# Patient Record
Sex: MALE | Race: WHITE | NOT HISPANIC OR LATINO
[De-identification: names, ages, dates, MRNs, and addresses within clinical notes are randomized per-mention and may not be internally consistent; named-entity substitution may affect disease eponyms.]

---

## 2017-01-11 ENCOUNTER — APPOINTMENT (OUTPATIENT)
Dept: SPINE | Facility: CLINIC | Age: 52
End: 2017-01-11

## 2017-01-11 VITALS
BODY MASS INDEX: 29.03 KG/M2 | WEIGHT: 185 LBS | DIASTOLIC BLOOD PRESSURE: 84 MMHG | HEIGHT: 67 IN | SYSTOLIC BLOOD PRESSURE: 132 MMHG

## 2020-10-09 ENCOUNTER — TRANSCRIPTION ENCOUNTER (OUTPATIENT)
Age: 55
End: 2020-10-09

## 2021-03-12 ENCOUNTER — TRANSCRIPTION ENCOUNTER (OUTPATIENT)
Age: 56
End: 2021-03-12

## 2021-05-07 ENCOUNTER — TRANSCRIPTION ENCOUNTER (OUTPATIENT)
Age: 56
End: 2021-05-07

## 2021-07-22 ENCOUNTER — TRANSCRIPTION ENCOUNTER (OUTPATIENT)
Age: 56
End: 2021-07-22

## 2022-03-23 ENCOUNTER — TRANSCRIPTION ENCOUNTER (OUTPATIENT)
Age: 57
End: 2022-03-23

## 2022-04-11 ENCOUNTER — APPOINTMENT (OUTPATIENT)
Dept: PAIN MANAGEMENT | Facility: CLINIC | Age: 57
End: 2022-04-11

## 2022-05-03 ENCOUNTER — APPOINTMENT (OUTPATIENT)
Dept: PAIN MANAGEMENT | Facility: CLINIC | Age: 57
End: 2022-05-03

## 2022-05-31 ENCOUNTER — APPOINTMENT (OUTPATIENT)
Dept: PAIN MANAGEMENT | Facility: CLINIC | Age: 57
End: 2022-05-31
Payer: MEDICARE

## 2022-05-31 VITALS — HEIGHT: 67 IN | WEIGHT: 193 LBS | BODY MASS INDEX: 30.29 KG/M2

## 2022-05-31 PROCEDURE — 99214 OFFICE O/P EST MOD 30 MIN: CPT

## 2022-05-31 NOTE — ASSESSMENT
[FreeTextEntry1] : Patient is presenting with acute/sub-acute radicular pain with impairment in ADLs and functionality.  The pain has not responded to  conservative care including nsaid therapy and/or physical therapy.  There is no bleeding tendency, unstable medical condition, or systemic infection.\par \par B/L L5-S1 TFESI.

## 2022-05-31 NOTE — HISTORY OF PRESENT ILLNESS
[Lower back] : lower back [8] : 8 [5] : 5 [Radiating] : radiating [Sharp] : sharp [Stabbing] : stabbing [Constant] : constant [Household chores] : household chores [Rest] : rest [Injection therapy] : injection therapy [Lying in bed] : lying in bed [Retired] : Work status: retired [FreeTextEntry1] : 05/31/2022: follow up today.  Pain is returning in both legs.  Will schedule repeat.  \par \par 1/10/22: follow up after right L4/5 L5/S1 TFESI on 12/14/21. Had 80% relief so far. Has been having erectile\par dysfunction and urinary incontinence for last 6 months. Has improved a little since injection. Patient advised to follow up with urology.\par \par 11/24/21: follow up today to review MRI's. (11/22/21)Impression:\par 1. Multilevel lumbar and lower thoracic degenerative disc disease most pronounced at L3-4 and L5-S1.\par 2. Small right-sided disc herniation at L1-2 without stenosis or nerve root compression.\par 3. Disc bulges at L2-3 and L3-4 without stenosis or nerve root compression.\par 4. Disc bulge with a right foraminal disc herniation at L4-5 with mild compression of the right L4 nerve root.\par 5. Grade 1 spondylolisthesis at L5-S1 secondary to bilateral spondylolysis of L5 with mild compression of both L5 nerve\par roots in the neural foramen.\par 6. Modic type 1 endplate changes adjacent to the L5-S1 disc.\par 7. Modic type 2 endplate changes adjacent to the L1-2, L3-4, and L5-S1 discs.\par Impression:\par 1. Postoperative study following multilevel cervical laminectomy and fusion without spinal cord or nerve root\par compression.\par 2. Multilevel cervical degenerative disc disease with reversal of cervical lordosis.\par 3. Small area of myelomalacia in the spinal cord at the level of the C5 superior endplate.\par \par 11/17/21: follow up today. since last visit, he is getting shooting pain across his lower back. Taking Lyrica 100mg PO TID. with some improvement. getting n/t down the left arm. His right leg gives out, externally rotates when he walks. He can not "control" his right leg after it is crossed. -->weakness. given his history I think this warrants a new MRI to evaluate for further cord compression. \par \par 8/10/21- Patient had 80% relief from injection. Has been going to the gym. \par \par 7/12/21- Patient had improvement initial from TFESI. Will schedule repeat.\par \par 5/25/21: follow up today after b/l TFESI L5/S1 on 5/11/21. he reports 2 days following he was pain free. he reports\par pain in the lower back much improved. Getting Lyrica which is helping. still with n/t in the fingers and hands. will\par increase to 100mg bid. \par \par 5/4/21- Patent had spinal surgery in August 2020. Pain improved in neck. Does have numbness in arms. Pain is in back of thighs radiating down both legs. Low back pain is not that bad. Has been on Oxycodone. May need Lyrica. gets burning in the back of the legs. \par \par 1/11/21: F/U TODAY TO REVIEW MRI'S. (1/4/21) Impression:\par 1. Status post laminectomy and posterior fusion from C3-C7. There is a new large fluid collection, probable\par seroma, within the laminectomy surgical site measuring 7.1 x 3.2 x 1.6 cm with multiple thin internal\par septations. No significant surrounding edema to suggest infection. The collection does not directly contact the\par thecal sac. The spinal canal at the surgical levels is significantly decompressed since the previous exam. \par 2. There is mildly progressed disc space narrowing from C3-C7 as compared to the previous exam.\par 3. Multilevel disc bulges which have not significantly changed since the previous exam. No large new\par herniation.\par MRI lumbar spine: Impression:\par 1. Moderate to severe multilevel degenerative disc disease causing varying degrees of the spinal canal and\par neuroforaminal stenosis, detailed above. The bulge at L4-5 is mildly increased in size.\par 2. 1 cm grade 1 anterolisthesis of L5 on S1 with bilateral L5 spondylolysis pars defects, unchanged.\par GURMEET's were helping, however not getting as much prolonged relief. still with relief from last GURMEET.\par \par 12/29/20: follow up today after LESI L5/S1 on 12/15/20. Back pain has improved following >85%. He is having new clonus in the legs and arms. Given his history I believe a MRI of the Cervical and lumbar spine to further evaluate. having continues n/t in the arms that is getting worse. \par \par 10/27/20: Follow up today after LESI L5/S1 on 10/14/20. Back pain about 85% improved. Had tightness across the\par lower back that improved. Pain over the right lower back. MRI of the neck again reviewed. Will need MRI in the near future to evaluate the myelomalacia. Pain in the posterior left calf. \par \par 9/30/20- patient had neck surgery August 18 and is feeling better already. Now complaining of low back pain. Would like a repeat epidural so he can participate in PT. Will get clearance from Dr. Lawson. [] : no [FreeTextEntry7] : b/l legs  [de-identified] : getting up from bed  [de-identified] : mri l spine

## 2022-06-05 ENCOUNTER — NON-APPOINTMENT (OUTPATIENT)
Age: 57
End: 2022-06-05

## 2022-06-07 ENCOUNTER — NON-APPOINTMENT (OUTPATIENT)
Age: 57
End: 2022-06-07

## 2022-06-07 DIAGNOSIS — Z87.891 PERSONAL HISTORY OF NICOTINE DEPENDENCE: ICD-10-CM

## 2022-06-07 DIAGNOSIS — F17.200 NICOTINE DEPENDENCE, UNSPECIFIED, UNCOMPLICATED: ICD-10-CM

## 2022-06-08 ENCOUNTER — APPOINTMENT (OUTPATIENT)
Dept: PAIN MANAGEMENT | Facility: CLINIC | Age: 57
End: 2022-06-08

## 2022-06-08 PROCEDURE — 64483 NJX AA&/STRD TFRM EPI L/S 1: CPT | Mod: 50

## 2022-06-08 NOTE — PROCEDURE
[FreeTextEntry3] : Date of Service: 06/08/2022 \par \par Account: [id number]\par \par Patient: FARIDA BOYD \par \par YOB: 1965\par \par Age: 56 year\par \par \par Surgeon:                                                        Debbie Russ M.D.\par \par Assistant:                                                        None.\par \par Pre-Operative Diagnosis:                            Lumbosacral Radiculitis (M54.17)\par \par Post Operative Diagnosis:                          Lumbosacral Radiculitis (M54.17)\par \par Procedure:                                                     Right L5-S1 transforaminal epidural steroid injection\par \par                                                                         Left L5-S1 transforaminal epidural steroid injection under fluoroscopic guidance.\par \par Anesthesia:                                                    MAC\par \par \par This procedure was carried out using fluoroscopic guidance.  The risks and benefits of the procedure were discussed extensively with the patient.  The consent of the patient was obtained and the following procedure was performed. The patient was placed in the prone position on the fluoroscopic table and the lumbar area was prepped and draped in a sterile fashion.\par \par The left L5-S1 neural foramen was then identified on right oblique  "konstantin dog" anatomical view at the 6 o' clock position using fluoroscopic guidance, and the area was marked. The overlying skin and subcutaneous structures were anesthetized using sterile technique with 1% Lidocaine.  A 22 gauge spinal needle was directed toward the inferior (6 o'clock) position of the pedicle, which formed the roof of the identified foramen.  Once in the epidural space, after negative aspiration for heme and CSF, 1cc of Omnipaque contrast was injected to confirm epidural location and assess filling defects and rule out intravascular needle placement.\par \par The following contrast flow and epidurogram was observed: no intravascular or intrathecal flow pattern was noted.  No blood or CSF was aspirated. Omnipaque spread appeared to outline the left L5 nerve root and spread medially into the epidural space.  \par \par After this, an injectate of 3 cc preservative free normal saline plus 40 mg of Kenalog was injected in the epidural space.\par \par The right L5-S1 neural foramen was then identified on right oblique  "konstantin dog" anatomical view at the 6 o' clock position using fluoroscopic guidance, and the area was marked. The overlying skin and subcutaneous structures were anesthetized using sterile technique with 1% Lidocaine.  A 22 gauge spinal needle was directed toward the inferior (6 o'clock) position of the pedicle, which formed the roof of the identified foramen.  Once in the epidural space, after negative aspiration for heme and CSF, 1cc of Omnipaque contrast was injected to confirm epidural location and assess filling defects and rule out intravascular needle placement. \par \par The following contrast flow and epidurogram was observed: no intravascular or intrathecal flow pattern was noted.  No blood or CSF was aspirated. Omnipaque spread appeared to outline the right L5 nerve root and spread medially into the epidural space.  \par \par After this, an injectate of 3 cc preservative free normal saline plus 40 mg of Kenalog was injected in the epidural space.\par \par The needle was subsequently removed.  Vital signs remained normal.  Pulse oximeter was used throughout the procedure and the patient's pulse and oxygen saturation remained within normal limits.  The patient tolerated the procedure well.  There were no complications.  The patient was instructed to apply ice over the injection sites for twenty minutes every two hours for the next 24 to 48 hours.  The patient was also instructed to contact me immediately if there were any problems.\par \par Debbie Russ M.D.\par

## 2022-06-09 VITALS — WEIGHT: 192 LBS | BODY MASS INDEX: 30.13 KG/M2 | HEIGHT: 67 IN

## 2022-06-09 PROBLEM — Z87.891 FORMER CIGARETTE SMOKER: Status: ACTIVE | Noted: 2022-06-09

## 2022-06-09 NOTE — HISTORY OF PRESENT ILLNESS
[Former] : former smoker [_____ pack-years] : [unfilled] pack-years [TextBox_13] : Referred by Dr. Sea Velez.\par \par Mr. BOYD is a 56 year old male with a history of HTN and COPD.\par \par He was called to review eligibility for Low-Dose CT lung cancer screening.  Reviewed and confirmed that the patient meets screening eligibility criteria:\par \par 56 years old \par \par Smoking Status: Former smoker \par \par Number of pack(s) per day: 2.5\par Number of years smoked: 25\par Number of pack years smokin.5\par \par Number of years since quitting smokin\par Quit year: 2020\par \par Mr. BOYD denies any symptoms of lung cancer, including new cough, change in cough, hemoptysis, and unintentional weight loss.\par \par Mr. BOYD denies any personal history of lung cancer.  No lung cancer in a first degree relative.  Denies any history of lung disease or any history of occupational exposures. [TextBox_6] : 2.5 [TextBox_8] : 25 [TextBox_10] : 2020

## 2022-06-10 ENCOUNTER — APPOINTMENT (OUTPATIENT)
Dept: CT IMAGING | Facility: CLINIC | Age: 57
End: 2022-06-10
Payer: MEDICARE

## 2022-06-10 PROCEDURE — 71271 CT THORAX LUNG CANCER SCR C-: CPT

## 2022-06-22 ENCOUNTER — APPOINTMENT (OUTPATIENT)
Dept: PAIN MANAGEMENT | Facility: CLINIC | Age: 57
End: 2022-06-22
Payer: MEDICARE

## 2022-06-22 VITALS — WEIGHT: 192 LBS | BODY MASS INDEX: 30.13 KG/M2 | HEIGHT: 67 IN

## 2022-06-22 DIAGNOSIS — N18.1 CHRONIC KIDNEY DISEASE, STAGE 1: ICD-10-CM

## 2022-06-22 PROCEDURE — 99214 OFFICE O/P EST MOD 30 MIN: CPT

## 2022-06-22 NOTE — HISTORY OF PRESENT ILLNESS
[Lower back] : lower back [8] : 8 [5] : 5 [Radiating] : radiating [Constant] : constant [Household chores] : household chores [Rest] : rest [Injection therapy] : injection therapy [Lying in bed] : lying in bed [Retired] : Work status: retired [Neck] : neck [Dull/Aching] : dull/aching [Walking] : walking [FreeTextEntry1] : 06/22/2022: follow up today after b/l L5/S1 TFESI on 6/8/22. he reports an overall improvement. now he has good days and bad days. Pain worse in the morning.  had lumbar MBB's in 2019 with 100% relief of his pain. ended up with a cervical myelopathy which put the lower back on hold \par \par 05/31/2022: follow up today.  Pain is returning in both legs.  Will schedule repeat.  \par \par 1/10/22: follow up after right L4/5 L5/S1 TFESI on 12/14/21. Had 80% relief so far. Has been having erectile\par dysfunction and urinary incontinence for last 6 months. Has improved a little since injection. Patient advised to follow up with urology.\par \par 11/24/21: follow up today to review MRI's. (11/22/21)Impression:\par 1. Multilevel lumbar and lower thoracic degenerative disc disease most pronounced at L3-4 and L5-S1.\par 2. Small right-sided disc herniation at L1-2 without stenosis or nerve root compression.\par 3. Disc bulges at L2-3 and L3-4 without stenosis or nerve root compression.\par 4. Disc bulge with a right foraminal disc herniation at L4-5 with mild compression of the right L4 nerve root.\par 5. Grade 1 spondylolisthesis at L5-S1 secondary to bilateral spondylolysis of L5 with mild compression of both L5 nerveroots in the neural foramen.\par 6. Modic type 1 endplate changes adjacent to the L5-S1 disc.\par 7. Modic type 2 endplate changes adjacent to the L1-2, L3-4, and L5-S1 discs.\par \par Impression:\par 1. Postoperative study following multilevel cervical laminectomy and fusion without spinal cord or nerve root\par compression.\par 2. Multilevel cervical degenerative disc disease with reversal of cervical lordosis.\par 3. Small area of myelomalacia in the spinal cord at the level of the C5 superior endplate.\par \par 11/17/21: follow up today. since last visit, he is getting shooting pain across his lower back. Taking Lyrica 100mg PO TID. with some improvement. getting n/t down the left arm. His right leg gives out, externally rotates when he walks. He can not "control" his right leg after it is crossed. -->weakness. given his history I think this warrants a new MRI to evaluate for further cord compression. \par \par 8/10/21- Patient had 80% relief from injection. Has been going to the gym. \par \par 7/12/21- Patient had improvement initial from TFESI. Will schedule repeat.\par \par 5/25/21: follow up today after b/l TFESI L5/S1 on 5/11/21. he reports 2 days following he was pain free. he reports\par pain in the lower back much improved. Getting Lyrica which is helping. still with n/t in the fingers and hands. will\par increase to 100mg bid. \par \par 5/4/21- Patent had spinal surgery in August 2020. Pain improved in neck. Does have numbness in arms. Pain is in back of thighs radiating down both legs. Low back pain is not that bad. Has been on Oxycodone. May need Lyrica. gets burning in the back of the legs. \par \par 1/11/21: F/U TODAY TO REVIEW MRI'S. (1/4/21) Impression:\par 1. Status post laminectomy and posterior fusion from C3-C7. There is a new large fluid collection, probable\par seroma, within the laminectomy surgical site measuring 7.1 x 3.2 x 1.6 cm with multiple thin internal\par septations. No significant surrounding edema to suggest infection. The collection does not directly contact the\par thecal sac. The spinal canal at the surgical levels is significantly decompressed since the previous exam. \par 2. There is mildly progressed disc space narrowing from C3-C7 as compared to the previous exam.\par 3. Multilevel disc bulges which have not significantly changed since the previous exam. No large new\par herniation.\par MRI lumbar spine: Impression:\par 1. Moderate to severe multilevel degenerative disc disease causing varying degrees of the spinal canal and\par neuroforaminal stenosis, detailed above. The bulge at L4-5 is mildly increased in size.\par 2. 1 cm grade 1 anterolisthesis of L5 on S1 with bilateral L5 spondylolysis pars defects, unchanged.\par GURMEET's were helping, however not getting as much prolonged relief. still with relief from last GURMEET.\par \par 12/29/20: follow up today after LESI L5/S1 on 12/15/20. Back pain has improved following >85%. He is having new clonus in the legs and arms. Given his history I believe a MRI of the Cervical and lumbar spine to further evaluate. having continues n/t in the arms that is getting worse. \par \par 10/27/20: Follow up today after LESI L5/S1 on 10/14/20. Back pain about 85% improved. Had tightness across the\par lower back that improved. Pain over the right lower back. MRI of the neck again reviewed. Will need MRI in the near future to evaluate the myelomalacia. Pain in the posterior left calf. \par \par 9/30/20- patient had neck surgery August 18 and is feeling better already. Now complaining of low back pain. Would like a repeat epidural so he can participate in PT. Will get clearance from Dr. Lawson. [] : no [FreeTextEntry6] : numbness  [FreeTextEntry7] : left forearm  [de-identified] : getting up from bed  [de-identified] : mri l spine

## 2022-06-22 NOTE — REVIEW OF SYSTEMS
Is This A New Presentation, Or A Follow-Up?: Follow Up Irritated Nevi
Additional History: Patient is here today for excision of rule out irritated nevus on right lower cutaneous lip.
[Negative] : Heme/Lymph

## 2022-06-22 NOTE — DATA REVIEWED
[MRI] : MRI [Lumbar Spine] : lumbar spine [Report was reviewed and noted in the chart] : The report was reviewed and noted in the chart [I independently reviewed and interpreted images and report] : I independently reviewed and interpreted images and report [I reviewed the films/CD and agree] : I reviewed the films/CD and agree

## 2022-06-22 NOTE — ASSESSMENT
[FreeTextEntry1] : After discussing various treatment options with the patient including but not limited to oral medications, physical therapy, exercise, modalities as well as interventional spinal injections, we have decided with the following plan:\par \par 1) Intervention Injection Therapy:\par I personally reviewed the MRI/CT scan images and agree with the radiologist's report. The radiological findings were discussed with the patient.\par The risks, benefits, contents and alternatives to injection were explained in full to the patient. Risks outlined include but are not limited to infection,sepsis, bleeding, post-dural puncture headache, nerve damage, temporary increase in pain, syncopal episode, failure to resolve symptoms, allergic reaction, symptom recurrence, and elevation of blood sugar in diabetics. Cortisone may cause immunosuppression. Patient understands the risks. All questions were answered. After discussion of options, patient requested an injection. Information regarding the injection was given to the patient. Which medications to stop prior to the injection was explained to the patient as well.\par \par Follow up in 1-2 weeks post injection for re-evaluation. \par Continue Home exercises, stretching, activity modification, physical therapy, and conservative care.\par \par b/l lumbar RFA -->will call \par \par \par

## 2022-08-10 ENCOUNTER — APPOINTMENT (OUTPATIENT)
Dept: PAIN MANAGEMENT | Facility: CLINIC | Age: 57
End: 2022-08-10

## 2022-08-10 PROCEDURE — 64636Z: CUSTOM | Mod: 50

## 2022-08-10 PROCEDURE — J3490M: CUSTOM

## 2022-08-10 PROCEDURE — 64635 DESTROY LUMB/SAC FACET JNT: CPT | Mod: LT

## 2022-08-10 NOTE — PROCEDURE
[FreeTextEntry3] : Date of Service: 08/10/2022 \par \par Account: [id number]\par \par Patient: FARIDA BOYD \par \par YOB: 1965\par \par Age: 57 year\par \par \par PREOPERATIVE DIAGNOSIS: Spondylosis of Lumbar Region without Myelopathy or Radiculopathy (M47.816)\par \par     1. \par \par         POSTOPERATIVE DIAGNOSIS: Spondylosis of Lumbar Region without Myelopathy or Radiculopathy (M47.816)\par \par     1. \par \par         PROCEDURE:\par \par         1) Right L3, L4, L5 and Left L3, L4, L5 medial branch radiofrequency ablation under fluoroscopic guidance.                        \par \par Anesthesia:                                                      MAC\par \par \par Risks, benefits and alternatives of the procedure were discussed with the patient after which they agreed to proceed.  Patient was brought into fluoroscopy suite and was placed in prone position with hip support. Back was prepped and draped in a sterile fashion x3. Anesthesia initiated.\par \par Under AP visualization, the right and left sacral ala was identified and marked. Using a 25 gauge ½ inch needle the skin and subcutaneous structures at this point were localized with 1% Lidocaine using approximately 3 cc's 1% Lidocaine.  After this, a 20 gauge 100mm Mann radiofrequency needle with a 10mm curved tip was inserted and using depth direction depth technique under constant fluoroscopic visualization, the needle was advanced to the sacral ala until os was contacted. \par \par The camera was then redirected under AP view to visualize the right and left L4 and L5 vertebra. The camera was obliqued to approximately 30 degrees to reveal good Irchard dog anatomical view. The junction of the superior articulate process and transverse process at the L4 and L5 levels  were then identified and marked. Skin and subcutaneous structures were then anesthetized with approximately 3 cc's of 1% Lidocaine at each of these levels. After which a 20 gauge 100mm [place] radiofrequency needle with a 10mm curved tip then advanced until the junction at the SAP and transverse process was met.  The camera was then directed through the lateral view and under constant fluoroscopic visualization, the needle tips were then advanced and confirmed at the junction of the SAP and transverse process. \par \par The stylette for the most cephalad needle at the L4 level was then removed and a [place] radiofrequency probe was then placed inside the needle. After their pedis' were checked at approximately 300 ohm, 50 hertz sensory stimulation was performed.  Patient experienced concordant pain in their low back at approximately 0.3 volts. The voltage was then increased to 1 volt. The patient reported increased low back pain symptoms without any radiation below the knee.  Stimulation was then changed to 2 hertz and increased slowly by .1 volt increments at approximately 0.5 volts, patient began to experience thumping like reproductive pain in their low back. The voltage was then increased to approximately 2.5 volts. Patient experienced increasing thumping without any sensation below their knee. This exact stimulation was then repeated for the L5 needle as well as sacral ala needle with concordant pain and no radiation below the knee. The 6 levels were then anesthetized with approximately 0.5 cc's of 1% Lidocaine. After which each area was then ablated at 80 degrees centigrade for 90 seconds each. Patient felt no pain reproduction during the ablation procedure.  After each of these levels were ablated and injected approximately 1 cc of 0.25% Bupivacaine plus 80 mg of Kenalog were then injected before the needles were removed.  Pressure was then applied to the low back. Band-aids were applied.  Patient was brought to the recovery, ambulated on their own after the procedure and reported decreased low back pain.\par \par Anesthesia personnel were present throughout the procedure. Patient was told to apply ice to the low back for 20 minutes on and 20 minutes off for focal symptoms for 24-48 hours. They should call the office if they have any questions or concerns. \par \par Debbie Russ M.D.\par

## 2022-08-24 ENCOUNTER — APPOINTMENT (OUTPATIENT)
Dept: PAIN MANAGEMENT | Facility: CLINIC | Age: 57
End: 2022-08-24

## 2022-08-24 VITALS — HEIGHT: 67 IN | WEIGHT: 195 LBS | BODY MASS INDEX: 30.61 KG/M2

## 2022-08-24 PROCEDURE — 99213 OFFICE O/P EST LOW 20 MIN: CPT

## 2022-08-24 NOTE — ASSESSMENT
[FreeTextEntry1] : After discussing various treatment options with the patient including but not limited to oral medications, physical therapy, exercise, modalities as well as interventional spinal injections, we have decided with the following plan:\par \par 1) spine surgeon follow up --> Dr. Miguel\par \par 2) can repeat RFA in 6 months if needed. \par \par \par \par

## 2022-08-24 NOTE — HISTORY OF PRESENT ILLNESS
[Lower back] : lower back [Rest] : rest [Injection therapy] : injection therapy [Walking] : walking [Neck] : neck [8] : 8 [5] : 5 [Dull/Aching] : dull/aching [Radiating] : radiating [Constant] : constant [Household chores] : household chores [Lying in bed] : lying in bed [Retired] : Work status: retired [FreeTextEntry1] : 08/24/2022: follow up today.  Had 100% relief from back pain from RFA.  Pain is returning in both legs.  He had no pain in legs for 1 week.  The last epidural only lasted 1 month.  Would like to consider surgery.\par \par 06/22/2022: follow up today after b/l L5/S1 TFESI on 6/8/22. he reports an overall improvement. now he has good days and bad days. Pain worse in the morning.  had lumbar MBB's in 2019 with 100% relief of his pain. ended up with a cervical myelopathy which put the lower back on hold \par \par 05/31/2022: follow up today.  Pain is returning in both legs.  Will schedule repeat.  \par \par 1/10/22: follow up after right L4/5 L5/S1 TFESI on 12/14/21. Had 80% relief so far. Has been having erectile\par dysfunction and urinary incontinence for last 6 months. Has improved a little since injection. Patient advised to follow up with urology.\par \par 11/24/21: follow up today to review MRI's. (11/22/21)Impression:\par 1. Multilevel lumbar and lower thoracic degenerative disc disease most pronounced at L3-4 and L5-S1.\par 2. Small right-sided disc herniation at L1-2 without stenosis or nerve root compression.\par 3. Disc bulges at L2-3 and L3-4 without stenosis or nerve root compression.\par 4. Disc bulge with a right foraminal disc herniation at L4-5 with mild compression of the right L4 nerve root.\par 5. Grade 1 spondylolisthesis at L5-S1 secondary to bilateral spondylolysis of L5 with mild compression of both L5 nerve roots in the neural foramen.\par 6. Modic type 1 endplate changes adjacent to the L5-S1 disc.\par 7. Modic type 2 endplate changes adjacent to the L1-2, L3-4, and L5-S1 discs.\par \par Impression:\par 1. Postoperative study following multilevel cervical laminectomy and fusion without spinal cord or nerve root\par compression.\par 2. Multilevel cervical degenerative disc disease with reversal of cervical lordosis.\par 3. Small area of myelomalacia in the spinal cord at the level of the C5 superior endplate.\par \par 11/17/21: follow up today. since last visit, he is getting shooting pain across his lower back. Taking Lyrica 100mg PO TID. with some improvement. getting n/t down the left arm. His right leg gives out, externally rotates when he walks. He can not "control" his right leg after it is crossed. -->weakness. given his history I think this warrants a new MRI to evaluate for further cord compression. \par \par 8/10/21- Patient had 80% relief from injection. Has been going to the gym. \par \par 7/12/21- Patient had improvement initial from TFESI. Will schedule repeat.\par \par 5/25/21: follow up today after b/l TFESI L5/S1 on 5/11/21. he reports 2 days following he was pain free. he reports\par pain in the lower back much improved. Getting Lyrica which is helping. still with n/t in the fingers and hands. will\par increase to 100mg bid. \par \par 5/4/21- Patent had spinal surgery in August 2020. Pain improved in neck. Does have numbness in arms. Pain is in back of thighs radiating down both legs. Low back pain is not that bad. Has been on Oxycodone. May need Lyrica. gets burning in the back of the legs. \par \par 1/11/21: F/U TODAY TO REVIEW MRI'S. (1/4/21) Impression:\par 1. Status post laminectomy and posterior fusion from C3-C7. There is a new large fluid collection, probable\par seroma, within the laminectomy surgical site measuring 7.1 x 3.2 x 1.6 cm with multiple thin internal\par septations. No significant surrounding edema to suggest infection. The collection does not directly contact the\par thecal sac. The spinal canal at the surgical levels is significantly decompressed since the previous exam. \par 2. There is mildly progressed disc space narrowing from C3-C7 as compared to the previous exam.\par 3. Multilevel disc bulges which have not significantly changed since the previous exam. No large new\par herniation.\par MRI lumbar spine: Impression:\par 1. Moderate to severe multilevel degenerative disc disease causing varying degrees of the spinal canal and\par neuroforaminal stenosis, detailed above. The bulge at L4-5 is mildly increased in size.\par 2. 1 cm grade 1 anterolisthesis of L5 on S1 with bilateral L5 spondylolysis pars defects, unchanged.\par GURMEET's were helping, however not getting as much prolonged relief. still with relief from last GURMEET.\par \par 12/29/20: follow up today after LESI L5/S1 on 12/15/20. Back pain has improved following >85%. He is having new clonus in the legs and arms. Given his history I believe a MRI of the Cervical and lumbar spine to further evaluate. having continues n/t in the arms that is getting worse. \par \par 10/27/20: Follow up today after LESI L5/S1 on 10/14/20. Back pain about 85% improved. Had tightness across the\par lower back that improved. Pain over the right lower back. MRI of the neck again reviewed. Will need MRI in the near future to evaluate the myelomalacia. Pain in the posterior left calf. \par \par 9/30/20- patient had neck surgery August 18 and is feeling better already. Now complaining of low back pain. Would like a repeat epidural so he can participate in PT. Will get clearance from Dr. Lawson. [] : no [FreeTextEntry6] : numbness  [FreeTextEntry7] : left forearm  [de-identified] : getting up from bed  [de-identified] : mri l spine  [TWNoteComboBox1] : 90%

## 2022-08-24 NOTE — PHYSICAL EXAM
[NL (90)] : forward flexion 90 degrees [] : no palpable masses [de-identified] : extension 20 degrees

## 2022-09-06 ENCOUNTER — APPOINTMENT (OUTPATIENT)
Dept: ORTHOPEDIC SURGERY | Facility: CLINIC | Age: 57
End: 2022-09-06

## 2022-09-16 ENCOUNTER — RX RENEWAL (OUTPATIENT)
Age: 57
End: 2022-09-16

## 2022-09-22 ENCOUNTER — APPOINTMENT (OUTPATIENT)
Dept: ORTHOPEDIC SURGERY | Facility: CLINIC | Age: 57
End: 2022-09-22

## 2022-09-22 VITALS — HEIGHT: 67 IN | WEIGHT: 195 LBS | BODY MASS INDEX: 30.61 KG/M2

## 2022-09-22 PROCEDURE — 72110 X-RAY EXAM L-2 SPINE 4/>VWS: CPT

## 2022-09-22 PROCEDURE — 99215 OFFICE O/P EST HI 40 MIN: CPT

## 2022-10-04 ENCOUNTER — FORM ENCOUNTER (OUTPATIENT)
Age: 57
End: 2022-10-04

## 2022-10-21 ENCOUNTER — OUTPATIENT (OUTPATIENT)
Dept: OUTPATIENT SERVICES | Facility: HOSPITAL | Age: 57
LOS: 1 days | Discharge: HOME | End: 2022-10-21

## 2022-10-21 VITALS
HEART RATE: 61 BPM | SYSTOLIC BLOOD PRESSURE: 123 MMHG | DIASTOLIC BLOOD PRESSURE: 90 MMHG | TEMPERATURE: 98 F | OXYGEN SATURATION: 96 % | HEIGHT: 67 IN | RESPIRATION RATE: 17 BRPM | WEIGHT: 204.37 LBS

## 2022-10-21 DIAGNOSIS — M47.816 SPONDYLOSIS WITHOUT MYELOPATHY OR RADICULOPATHY, LUMBAR REGION: ICD-10-CM

## 2022-10-21 DIAGNOSIS — Z01.818 ENCOUNTER FOR OTHER PREPROCEDURAL EXAMINATION: ICD-10-CM

## 2022-10-21 DIAGNOSIS — Z98.890 OTHER SPECIFIED POSTPROCEDURAL STATES: Chronic | ICD-10-CM

## 2022-10-21 DIAGNOSIS — Z98.1 ARTHRODESIS STATUS: Chronic | ICD-10-CM

## 2022-10-21 DIAGNOSIS — E78.5 HYPERLIPIDEMIA, UNSPECIFIED: ICD-10-CM

## 2022-10-21 LAB
A1C WITH ESTIMATED AVERAGE GLUCOSE RESULT: 5.9 % — HIGH (ref 4–5.6)
ALBUMIN SERPL ELPH-MCNC: 3.7 G/DL — SIGNIFICANT CHANGE UP (ref 3.3–5)
ALP SERPL-CCNC: 70 U/L — SIGNIFICANT CHANGE UP (ref 40–120)
ALT FLD-CCNC: 23 U/L — SIGNIFICANT CHANGE UP (ref 12–78)
ANION GAP SERPL CALC-SCNC: 7 MMOL/L — SIGNIFICANT CHANGE UP (ref 5–17)
APTT BLD: 40.1 SEC — HIGH (ref 27.5–35.5)
AST SERPL-CCNC: 24 U/L — SIGNIFICANT CHANGE UP (ref 15–37)
BASOPHILS # BLD AUTO: 0.12 K/UL — SIGNIFICANT CHANGE UP (ref 0–0.2)
BASOPHILS NFR BLD AUTO: 1.3 % — SIGNIFICANT CHANGE UP (ref 0–2)
BILIRUB SERPL-MCNC: 0.4 MG/DL — SIGNIFICANT CHANGE UP (ref 0.2–1.2)
BUN SERPL-MCNC: 25 MG/DL — HIGH (ref 7–23)
CALCIUM SERPL-MCNC: 9.5 MG/DL — SIGNIFICANT CHANGE UP (ref 8.5–10.1)
CHLORIDE SERPL-SCNC: 111 MMOL/L — HIGH (ref 96–108)
CO2 SERPL-SCNC: 25 MMOL/L — SIGNIFICANT CHANGE UP (ref 22–31)
CREAT SERPL-MCNC: 1.53 MG/DL — HIGH (ref 0.5–1.3)
EGFR: 52 ML/MIN/1.73M2 — LOW
EOSINOPHIL # BLD AUTO: 0.43 K/UL — SIGNIFICANT CHANGE UP (ref 0–0.5)
EOSINOPHIL NFR BLD AUTO: 4.6 % — SIGNIFICANT CHANGE UP (ref 0–6)
ESTIMATED AVERAGE GLUCOSE: 123 MG/DL — HIGH (ref 68–114)
GLUCOSE SERPL-MCNC: 87 MG/DL — SIGNIFICANT CHANGE UP (ref 70–99)
HCT VFR BLD CALC: 47.5 % — SIGNIFICANT CHANGE UP (ref 39–50)
HGB BLD-MCNC: 15.4 G/DL — SIGNIFICANT CHANGE UP (ref 13–17)
IMM GRANULOCYTES NFR BLD AUTO: 1.2 % — HIGH (ref 0–0.9)
INR BLD: 1.02 RATIO — SIGNIFICANT CHANGE UP (ref 0.88–1.16)
LYMPHOCYTES # BLD AUTO: 2.21 K/UL — SIGNIFICANT CHANGE UP (ref 1–3.3)
LYMPHOCYTES # BLD AUTO: 23.5 % — SIGNIFICANT CHANGE UP (ref 13–44)
MCHC RBC-ENTMCNC: 30.4 PG — SIGNIFICANT CHANGE UP (ref 27–34)
MCHC RBC-ENTMCNC: 32.4 G/DL — SIGNIFICANT CHANGE UP (ref 32–36)
MCV RBC AUTO: 93.9 FL — SIGNIFICANT CHANGE UP (ref 80–100)
MONOCYTES # BLD AUTO: 1.08 K/UL — HIGH (ref 0–0.9)
MONOCYTES NFR BLD AUTO: 11.5 % — SIGNIFICANT CHANGE UP (ref 2–14)
NEUTROPHILS # BLD AUTO: 5.44 K/UL — SIGNIFICANT CHANGE UP (ref 1.8–7.4)
NEUTROPHILS NFR BLD AUTO: 57.9 % — SIGNIFICANT CHANGE UP (ref 43–77)
NRBC # BLD: 0 /100 WBCS — SIGNIFICANT CHANGE UP (ref 0–0)
PLATELET # BLD AUTO: 159 K/UL — SIGNIFICANT CHANGE UP (ref 150–400)
POTASSIUM SERPL-MCNC: 4.6 MMOL/L — SIGNIFICANT CHANGE UP (ref 3.5–5.3)
POTASSIUM SERPL-SCNC: 4.6 MMOL/L — SIGNIFICANT CHANGE UP (ref 3.5–5.3)
PROT SERPL-MCNC: 7.1 GM/DL — SIGNIFICANT CHANGE UP (ref 6–8.3)
PROTHROM AB SERPL-ACNC: 12.3 SEC — SIGNIFICANT CHANGE UP (ref 10.5–13.4)
RBC # BLD: 5.06 M/UL — SIGNIFICANT CHANGE UP (ref 4.2–5.8)
RBC # FLD: 14.9 % — HIGH (ref 10.3–14.5)
SODIUM SERPL-SCNC: 143 MMOL/L — SIGNIFICANT CHANGE UP (ref 135–145)
VIT D25+D1,25 OH+D1,25 PNL SERPL-MCNC: 40.2 PG/ML — SIGNIFICANT CHANGE UP (ref 19.9–79.3)
WBC # BLD: 9.39 K/UL — SIGNIFICANT CHANGE UP (ref 3.8–10.5)
WBC # FLD AUTO: 9.39 K/UL — SIGNIFICANT CHANGE UP (ref 3.8–10.5)

## 2022-10-21 PROCEDURE — 86077 PHYS BLOOD BANK SERV XMATCH: CPT

## 2022-10-21 PROCEDURE — 93010 ELECTROCARDIOGRAM REPORT: CPT

## 2022-10-21 NOTE — H&P PST ADULT - ASSESSMENT
lumbar spondylosis  CAPRINI SCORE    AGE RELATED RISK FACTORS                                                       MOBILITY RELATED FACTORS  [x ] Age 41-60 years                                            (1 Point)                  [ ] Bed rest                                                        (1 Point)  [ ] Age: 61-74 years                                           (2 Points)                [ ] Plaster cast                                                   (2 Points)  [ ] Age= 75 years                                              (3 Points)                 [ ] Bed bound for more than 72 hours                   (2 Points)    DISEASE RELATED RISK FACTORS                                               GENDER SPECIFIC FACTORS  [ ] Edema in the lower extremities                       (1 Point)                  [ ] Pregnancy                                                     (1 Point)  [ ] Varicose veins                                               (1 Point)                  [ ] Post-partum < 6 weeks                                   (1 Point)             [x ] BMI > 25 Kg/m2                                            (1 Point)                  [ ] Hormonal therapy  or oral contraception            (1 Point)                 [ ] Sepsis (in the previous month)                        (1 Point)                  [ ] History of pregnancy complications  [ ] Pneumonia or serious lung disease                                               [ ] Unexplained or recurrent                       (1 Point)           (in the previous month)                               (1 Point)  [ ] Abnormal pulmonary function test                     (1 Point)                 SURGERY RELATED RISK FACTORS  [ ] Acute myocardial infarction                              (1 Point)                 [ ]  Section                                            (1 Point)  [ ] Congestive heart failure (in the previous month)  (1 Point)                 [ ] Minor surgery                                                 (1 Point)   [ ] Inflammatory bowel disease                             (1 Point)                 [x ] Arthroscopic surgery                                        (2 Points)  [ ] Central venous access                                    (2 Points)                [ ] General surgery lasting more than 45 minutes   (2 Points)       [ ] Stroke (in the previous month)                          (5 Points)               [ ] Elective arthroplasty                                        (5 Points)                                                                                                                                               HEMATOLOGY RELATED FACTORS                                                 TRAUMA RELATED RISK FACTORS  [ ] Prior episodes of VTE                                     (3 Points)                 [ ] Fracture of the hip, pelvis, or leg                       (5 Points)  [ ] Positive family history for VTE                         (3 Points)                 [ ] Acute spinal cord injury (in the previous month)  (5 Points)  [ ] Prothrombin 17565 A                                      (3 Points)                 [ ] Paralysis  (less than 1 month)                          (5 Points)  [ ] Factor V Leiden                                             (3 Points)                 [ ] Multiple Trauma within 1 month                         (5 Points)  [ ] Lupus anticoagulants                                     (3 Points)                                                           [ ] Anticardiolipin antibodies                                (3 Points)                                                       [ ] High homocysteine in the blood                      (3 Points)                                             [ ] Other congenital or acquired thrombophilia       (3 Points)                                                [ ] Heparin induced thrombocytopenia                  (3 Points)                                          Total Score [    4      ]  Caprini Score 0-2: Low risk, No VTE Prophylaxis required for most patient's, encourage ambulation  Caprini Score 3-6: At Risk, Pharmacologic VTE prophylaxis is indicated for most patients ( in the absence of a contraindication)  Caprini Score Greater than or = 7: High Risk , pharmacologic VTE is indicated for most patients ( in the absence of a contraindication)    Caprini score indicates that the patient is high risk for VTE event ( score 6 or greater). Surgical patient's in this group will benefit from both pharmacologic prophylaxis and intermittent compression devices . Surgical team will determine the balance between VTE  risk and bleeding risk and other clinical considerations

## 2022-10-21 NOTE — H&P PST ADULT - NSANTHOSAYNRD_GEN_A_CORE
No. FLORY screening performed.  STOP BANG Legend: 0-2 = LOW Risk; 3-4 = INTERMEDIATE Risk; 5-8 = HIGH Risk

## 2022-10-21 NOTE — H&P PST ADULT - HEMATOLOGY/LYMPHATICS
Took call from patient directly.  Discussed the return travel arrangements.  Informed him that he would have to come back for PRRT if that is what Dr Basilio recommends for him.  He verbalized understanding.   negative

## 2022-10-21 NOTE — H&P PST ADULT - HISTORY OF PRESENT ILLNESS
58 yo male with back pains which has worsened despite conservative management -now affecting his legs- secondary to spondylosis- scheduled for anterior lumbar fusion   denies recent travels in the past 30 days. No fever, SOB, cough, flu like symptoms or body rash- covid screen   not vaccinated for covid19

## 2022-10-21 NOTE — H&P PST ADULT - PROBLEM SELECTOR PLAN 3
Assessment and Plan: labs - cbc,pt/ptt,bmp,t&s,nose cx,ekg  M/C required  preop 3 day hibiclens instruction reviewed and given .instructed on if  nose cx positive use mupuricin 5 days and checklist given  take routine meds DOS with sips of water. avoid NSAID and OTC supplements. verbalized understanding  information on proper nutrition , increase protein and better food choices provided in packet  patient instructed on having covid19 swab 3-5 days prior to surgery  anesthesiologist to review pst labs, ekg, medical clearances and optimization for surgery

## 2022-10-22 LAB
MRSA PCR RESULT.: SIGNIFICANT CHANGE UP
S AUREUS DNA NOSE QL NAA+PROBE: SIGNIFICANT CHANGE UP

## 2022-10-31 LAB
APTT BLD: 39.2 SEC — HIGH (ref 27.5–35.5)
MRSA PCR RESULT.: SIGNIFICANT CHANGE UP
S AUREUS DNA NOSE QL NAA+PROBE: SIGNIFICANT CHANGE UP

## 2022-11-01 NOTE — HISTORY OF PRESENT ILLNESS
[Lower back] : lower back [Gradual] : gradual [7] : 7 [Dull/Aching] : dull/aching [Sharp] : sharp [Shooting] : shooting [Constant] : constant [Retired] : Work status: retired [de-identified] : had partial and transient relief with injections and MBB\par pain shooting down the legs;   has done PT directed exercises at home for months ;  completed a formal course of PT in the years and months past [] : no [FreeTextEntry5] : pt states he has been experiencing pain on his  lower back for a while, pt states he has surgery on his neck 2 years ago  [FreeTextEntry7] : left leg/ calf

## 2022-11-01 NOTE — ASSESSMENT
[FreeTextEntry1] : 5-1 and 3-4 collapsed with moderate to high grade foraminal stenosis;  there is an anterolisthesis at 5-1 due to a spondylolysis and asymmetric collapse and slip at 3-4 with foraminal stenosis;  the 4-5 facet joints are gapped so there is a dynamic slip;  having failed comprehensive nonsurgical care I proposed he undergo surgery;  while there are a few options WRT the approach in his particular case,  I proposed ALIF 5-1 and 3-4  then  instrumented fusion L345-S1;  4-5 would be posterolateral fusion ;  so the posterior screws can be placed using an open technique or navigated open

## 2022-11-10 ENCOUNTER — TRANSCRIPTION ENCOUNTER (OUTPATIENT)
Age: 57
End: 2022-11-10

## 2022-11-11 ENCOUNTER — INPATIENT (INPATIENT)
Facility: HOSPITAL | Age: 57
LOS: 1 days | Discharge: HOME | End: 2022-11-13
Attending: ORTHOPAEDIC SURGERY | Admitting: ORTHOPAEDIC SURGERY

## 2022-11-11 ENCOUNTER — APPOINTMENT (OUTPATIENT)
Dept: ORTHOPEDIC SURGERY | Facility: HOSPITAL | Age: 57
End: 2022-11-11

## 2022-11-11 ENCOUNTER — TRANSCRIPTION ENCOUNTER (OUTPATIENT)
Age: 57
End: 2022-11-11

## 2022-11-11 VITALS
TEMPERATURE: 98 F | WEIGHT: 203.93 LBS | DIASTOLIC BLOOD PRESSURE: 90 MMHG | HEART RATE: 67 BPM | OXYGEN SATURATION: 96 % | SYSTOLIC BLOOD PRESSURE: 116 MMHG | HEIGHT: 67 IN | RESPIRATION RATE: 18 BRPM

## 2022-11-11 DIAGNOSIS — T83.9XXA UNSPECIFIED COMPLICATION OF GENITOURINARY PROSTHETIC DEVICE, IMPLANT AND GRAFT, INITIAL ENCOUNTER: ICD-10-CM

## 2022-11-11 DIAGNOSIS — M48.061 SPINAL STENOSIS, LUMBAR REGION WITHOUT NEUROGENIC CLAUDICATION: ICD-10-CM

## 2022-11-11 DIAGNOSIS — M54.17 RADICULOPATHY, LUMBOSACRAL REGION: ICD-10-CM

## 2022-11-11 DIAGNOSIS — Z98.1 ARTHRODESIS STATUS: Chronic | ICD-10-CM

## 2022-11-11 DIAGNOSIS — Z98.890 OTHER SPECIFIED POSTPROCEDURAL STATES: ICD-10-CM

## 2022-11-11 DIAGNOSIS — F41.9 ANXIETY DISORDER, UNSPECIFIED: ICD-10-CM

## 2022-11-11 DIAGNOSIS — M47.26 OTHER SPONDYLOSIS WITH RADICULOPATHY, LUMBAR REGION: ICD-10-CM

## 2022-11-11 DIAGNOSIS — M41.56 OTHER SECONDARY SCOLIOSIS, LUMBAR REGION: ICD-10-CM

## 2022-11-11 DIAGNOSIS — Z98.890 OTHER SPECIFIED POSTPROCEDURAL STATES: Chronic | ICD-10-CM

## 2022-11-11 DIAGNOSIS — E78.5 HYPERLIPIDEMIA, UNSPECIFIED: ICD-10-CM

## 2022-11-11 DIAGNOSIS — N35.919 UNSPECIFIED URETHRAL STRICTURE, MALE, UNSPECIFIED SITE: ICD-10-CM

## 2022-11-11 DIAGNOSIS — I10 ESSENTIAL (PRIMARY) HYPERTENSION: ICD-10-CM

## 2022-11-11 DIAGNOSIS — R33.9 RETENTION OF URINE, UNSPECIFIED: ICD-10-CM

## 2022-11-11 DIAGNOSIS — G62.9 POLYNEUROPATHY, UNSPECIFIED: ICD-10-CM

## 2022-11-11 DIAGNOSIS — M51.16 INTERVERTEBRAL DISC DISORDERS WITH RADICULOPATHY, LUMBAR REGION: ICD-10-CM

## 2022-11-11 DIAGNOSIS — M43.17 SPONDYLOLISTHESIS, LUMBOSACRAL REGION: ICD-10-CM

## 2022-11-11 LAB
ANION GAP SERPL CALC-SCNC: 5 MMOL/L — SIGNIFICANT CHANGE UP (ref 5–17)
BASOPHILS # BLD AUTO: 0.11 K/UL — SIGNIFICANT CHANGE UP (ref 0–0.2)
BASOPHILS NFR BLD AUTO: 0.9 % — SIGNIFICANT CHANGE UP (ref 0–2)
BUN SERPL-MCNC: 22 MG/DL — SIGNIFICANT CHANGE UP (ref 7–23)
CALCIUM SERPL-MCNC: 8.5 MG/DL — SIGNIFICANT CHANGE UP (ref 8.5–10.1)
CHLORIDE SERPL-SCNC: 108 MMOL/L — SIGNIFICANT CHANGE UP (ref 96–108)
CO2 SERPL-SCNC: 28 MMOL/L — SIGNIFICANT CHANGE UP (ref 22–31)
CREAT SERPL-MCNC: 1.75 MG/DL — HIGH (ref 0.5–1.3)
EGFR: 44 ML/MIN/1.73M2 — LOW
EOSINOPHIL # BLD AUTO: 0.36 K/UL — SIGNIFICANT CHANGE UP (ref 0–0.5)
EOSINOPHIL NFR BLD AUTO: 2.9 % — SIGNIFICANT CHANGE UP (ref 0–6)
GLUCOSE SERPL-MCNC: 105 MG/DL — HIGH (ref 70–99)
HCT VFR BLD CALC: 43 % — SIGNIFICANT CHANGE UP (ref 39–50)
HGB BLD-MCNC: 13.7 G/DL — SIGNIFICANT CHANGE UP (ref 13–17)
IMM GRANULOCYTES NFR BLD AUTO: 2.4 % — HIGH (ref 0–0.9)
LYMPHOCYTES # BLD AUTO: 1.32 K/UL — SIGNIFICANT CHANGE UP (ref 1–3.3)
LYMPHOCYTES # BLD AUTO: 10.8 % — LOW (ref 13–44)
MCHC RBC-ENTMCNC: 30.2 PG — SIGNIFICANT CHANGE UP (ref 27–34)
MCHC RBC-ENTMCNC: 31.9 G/DL — LOW (ref 32–36)
MCV RBC AUTO: 94.7 FL — SIGNIFICANT CHANGE UP (ref 80–100)
MONOCYTES # BLD AUTO: 0.81 K/UL — SIGNIFICANT CHANGE UP (ref 0–0.9)
MONOCYTES NFR BLD AUTO: 6.6 % — SIGNIFICANT CHANGE UP (ref 2–14)
NEUTROPHILS # BLD AUTO: 9.37 K/UL — HIGH (ref 1.8–7.4)
NEUTROPHILS NFR BLD AUTO: 76.4 % — SIGNIFICANT CHANGE UP (ref 43–77)
NRBC # BLD: 0 /100 WBCS — SIGNIFICANT CHANGE UP (ref 0–0)
PLATELET # BLD AUTO: 134 K/UL — LOW (ref 150–400)
POTASSIUM SERPL-MCNC: 4.8 MMOL/L — SIGNIFICANT CHANGE UP (ref 3.5–5.3)
POTASSIUM SERPL-SCNC: 4.8 MMOL/L — SIGNIFICANT CHANGE UP (ref 3.5–5.3)
RBC # BLD: 4.54 M/UL — SIGNIFICANT CHANGE UP (ref 4.2–5.8)
RBC # FLD: 15.5 % — HIGH (ref 10.3–14.5)
SODIUM SERPL-SCNC: 141 MMOL/L — SIGNIFICANT CHANGE UP (ref 135–145)
WBC # BLD: 12.27 K/UL — HIGH (ref 3.8–10.5)
WBC # FLD AUTO: 12.27 K/UL — HIGH (ref 3.8–10.5)

## 2022-11-11 PROCEDURE — 22558 ARTHRD ANT NTRBD MIN DSC LUM: CPT | Mod: 62

## 2022-11-11 PROCEDURE — 51702 INSERT TEMP BLADDER CATH: CPT

## 2022-11-11 PROCEDURE — 22853 INSJ BIOMECHANICAL DEVICE: CPT

## 2022-11-11 DEVICE — PIN PULSE HIP AND REGISTRATION FIDUCIAL 15CM: Type: IMPLANTABLE DEVICE | Status: FUNCTIONAL

## 2022-11-11 DEVICE — IMPLANTABLE DEVICE: Type: IMPLANTABLE DEVICE | Status: FUNCTIONAL

## 2022-11-11 DEVICE — CLIP APPLIER COVIDIEN SURGICLIP 13" LARGE: Type: IMPLANTABLE DEVICE | Status: FUNCTIONAL

## 2022-11-11 DEVICE — SCREW LOCKG OPEN TULIP RELINE 5.5MM: Type: IMPLANTABLE DEVICE | Status: FUNCTIONAL

## 2022-11-11 DEVICE — PIN PULSE HIP AND REGISTRATION FIDUCIAL 10CM: Type: IMPLANTABLE DEVICE | Status: FUNCTIONAL

## 2022-11-11 DEVICE — SCREW MAS POLYAXIAL 2C RELINE 6.5X45MM: Type: IMPLANTABLE DEVICE | Status: FUNCTIONAL

## 2022-11-11 DEVICE — CLIP APPLIER COVIDIEN SURGICLIP 11.5" MEDIUM: Type: IMPLANTABLE DEVICE | Status: FUNCTIONAL

## 2022-11-11 DEVICE — GRAFT BONE INFUSE KIT LG II: Type: IMPLANTABLE DEVICE | Status: FUNCTIONAL

## 2022-11-11 DEVICE — PLATE ROI-A THICK MED PLUS .5MM: Type: IMPLANTABLE DEVICE | Status: FUNCTIONAL

## 2022-11-11 DEVICE — SURGIFLO MATRIX WITH THROMBIN KIT: Type: IMPLANTABLE DEVICE | Status: FUNCTIONAL

## 2022-11-11 RX ORDER — TAMSULOSIN HYDROCHLORIDE 0.4 MG/1
0.8 CAPSULE ORAL AT BEDTIME
Refills: 0 | Status: DISCONTINUED | OUTPATIENT
Start: 2022-11-11 | End: 2022-11-13

## 2022-11-11 RX ORDER — ONDANSETRON 8 MG/1
4 TABLET, FILM COATED ORAL ONCE
Refills: 0 | Status: DISCONTINUED | OUTPATIENT
Start: 2022-11-11 | End: 2022-11-11

## 2022-11-11 RX ORDER — ASPIRIN/CALCIUM CARB/MAGNESIUM 324 MG
1 TABLET ORAL
Qty: 0 | Refills: 0 | DISCHARGE

## 2022-11-11 RX ORDER — POLYETHYLENE GLYCOL 3350 17 G/17G
17 POWDER, FOR SOLUTION ORAL
Refills: 0 | Status: DISCONTINUED | OUTPATIENT
Start: 2022-11-11 | End: 2022-11-13

## 2022-11-11 RX ORDER — SODIUM CHLORIDE 9 MG/ML
1000 INJECTION, SOLUTION INTRAVENOUS
Refills: 0 | Status: DISCONTINUED | OUTPATIENT
Start: 2022-11-11 | End: 2022-11-11

## 2022-11-11 RX ORDER — OXYCODONE HYDROCHLORIDE 5 MG/1
10 TABLET ORAL EVERY 4 HOURS
Refills: 0 | Status: DISCONTINUED | OUTPATIENT
Start: 2022-11-11 | End: 2022-11-13

## 2022-11-11 RX ORDER — OXYCODONE HYDROCHLORIDE 5 MG/1
10 TABLET ORAL EVERY 12 HOURS
Refills: 0 | Status: DISCONTINUED | OUTPATIENT
Start: 2022-11-11 | End: 2022-11-13

## 2022-11-11 RX ORDER — ACETAMINOPHEN 500 MG
975 TABLET ORAL EVERY 8 HOURS
Refills: 0 | Status: DISCONTINUED | OUTPATIENT
Start: 2022-11-11 | End: 2022-11-13

## 2022-11-11 RX ORDER — OXYCODONE HYDROCHLORIDE 5 MG/1
15 TABLET ORAL EVERY 4 HOURS
Refills: 0 | Status: DISCONTINUED | OUTPATIENT
Start: 2022-11-11 | End: 2022-11-13

## 2022-11-11 RX ORDER — SENNA PLUS 8.6 MG/1
2 TABLET ORAL AT BEDTIME
Refills: 0 | Status: DISCONTINUED | OUTPATIENT
Start: 2022-11-11 | End: 2022-11-13

## 2022-11-11 RX ORDER — HYDROMORPHONE HYDROCHLORIDE 2 MG/ML
0.5 INJECTION INTRAMUSCULAR; INTRAVENOUS; SUBCUTANEOUS
Refills: 0 | Status: DISCONTINUED | OUTPATIENT
Start: 2022-11-11 | End: 2022-11-11

## 2022-11-11 RX ORDER — CYCLOBENZAPRINE HYDROCHLORIDE 10 MG/1
10 TABLET, FILM COATED ORAL EVERY 8 HOURS
Refills: 0 | Status: DISCONTINUED | OUTPATIENT
Start: 2022-11-11 | End: 2022-11-13

## 2022-11-11 RX ORDER — ACETAMINOPHEN 500 MG
1000 TABLET ORAL ONCE
Refills: 0 | Status: COMPLETED | OUTPATIENT
Start: 2022-11-11 | End: 2022-11-11

## 2022-11-11 RX ORDER — ATORVASTATIN CALCIUM 80 MG/1
20 TABLET, FILM COATED ORAL AT BEDTIME
Refills: 0 | Status: DISCONTINUED | OUTPATIENT
Start: 2022-11-12 | End: 2022-11-13

## 2022-11-11 RX ORDER — ONDANSETRON 8 MG/1
4 TABLET, FILM COATED ORAL EVERY 6 HOURS
Refills: 0 | Status: DISCONTINUED | OUTPATIENT
Start: 2022-11-11 | End: 2022-11-13

## 2022-11-11 RX ORDER — HYDROMORPHONE HYDROCHLORIDE 2 MG/ML
1 INJECTION INTRAMUSCULAR; INTRAVENOUS; SUBCUTANEOUS
Refills: 0 | Status: DISCONTINUED | OUTPATIENT
Start: 2022-11-11 | End: 2022-11-11

## 2022-11-11 RX ORDER — SODIUM CHLORIDE 9 MG/ML
3 INJECTION INTRAMUSCULAR; INTRAVENOUS; SUBCUTANEOUS EVERY 8 HOURS
Refills: 0 | Status: DISCONTINUED | OUTPATIENT
Start: 2022-11-11 | End: 2022-11-11

## 2022-11-11 RX ORDER — ASPIRIN/CALCIUM CARB/MAGNESIUM 324 MG
81 TABLET ORAL DAILY
Refills: 0 | Status: DISCONTINUED | OUTPATIENT
Start: 2022-11-13 | End: 2022-11-13

## 2022-11-11 RX ORDER — ATORVASTATIN CALCIUM 80 MG/1
1 TABLET, FILM COATED ORAL
Qty: 0 | Refills: 0 | DISCHARGE

## 2022-11-11 RX ORDER — HYDROMORPHONE HYDROCHLORIDE 2 MG/ML
0.5 INJECTION INTRAMUSCULAR; INTRAVENOUS; SUBCUTANEOUS
Refills: 0 | Status: DISCONTINUED | OUTPATIENT
Start: 2022-11-11 | End: 2022-11-13

## 2022-11-11 RX ADMIN — SODIUM CHLORIDE 100 MILLILITER(S): 9 INJECTION, SOLUTION INTRAVENOUS at 12:08

## 2022-11-11 RX ADMIN — SODIUM CHLORIDE 75 MILLILITER(S): 9 INJECTION, SOLUTION INTRAVENOUS at 17:06

## 2022-11-11 RX ADMIN — HYDROMORPHONE HYDROCHLORIDE 0.5 MILLIGRAM(S): 2 INJECTION INTRAMUSCULAR; INTRAVENOUS; SUBCUTANEOUS at 11:54

## 2022-11-11 RX ADMIN — HYDROMORPHONE HYDROCHLORIDE 0.5 MILLIGRAM(S): 2 INJECTION INTRAMUSCULAR; INTRAVENOUS; SUBCUTANEOUS at 11:55

## 2022-11-11 RX ADMIN — SODIUM CHLORIDE 100 MILLILITER(S): 9 INJECTION, SOLUTION INTRAVENOUS at 11:40

## 2022-11-11 RX ADMIN — TAMSULOSIN HYDROCHLORIDE 0.8 MILLIGRAM(S): 0.4 CAPSULE ORAL at 22:37

## 2022-11-11 RX ADMIN — SENNA PLUS 2 TABLET(S): 8.6 TABLET ORAL at 22:07

## 2022-11-11 RX ADMIN — Medication 975 MILLIGRAM(S): at 22:08

## 2022-11-11 RX ADMIN — OXYCODONE HYDROCHLORIDE 10 MILLIGRAM(S): 5 TABLET ORAL at 17:03

## 2022-11-11 RX ADMIN — HYDROMORPHONE HYDROCHLORIDE 0.5 MILLIGRAM(S): 2 INJECTION INTRAMUSCULAR; INTRAVENOUS; SUBCUTANEOUS at 12:10

## 2022-11-11 RX ADMIN — HYDROMORPHONE HYDROCHLORIDE 0.5 MILLIGRAM(S): 2 INJECTION INTRAMUSCULAR; INTRAVENOUS; SUBCUTANEOUS at 12:38

## 2022-11-11 RX ADMIN — OXYCODONE HYDROCHLORIDE 10 MILLIGRAM(S): 5 TABLET ORAL at 18:00

## 2022-11-11 RX ADMIN — POLYETHYLENE GLYCOL 3350 17 GRAM(S): 17 POWDER, FOR SOLUTION ORAL at 18:28

## 2022-11-11 RX ADMIN — Medication 400 MILLIGRAM(S): at 12:08

## 2022-11-11 RX ADMIN — HYDROMORPHONE HYDROCHLORIDE 0.5 MILLIGRAM(S): 2 INJECTION INTRAMUSCULAR; INTRAVENOUS; SUBCUTANEOUS at 11:39

## 2022-11-11 RX ADMIN — Medication 100 MILLIGRAM(S): at 22:08

## 2022-11-11 RX ADMIN — CYCLOBENZAPRINE HYDROCHLORIDE 10 MILLIGRAM(S): 10 TABLET, FILM COATED ORAL at 22:08

## 2022-11-11 RX ADMIN — Medication 1000 MILLIGRAM(S): at 12:23

## 2022-11-11 RX ADMIN — Medication 100 MILLIGRAM(S): at 17:05

## 2022-11-11 RX ADMIN — OXYCODONE HYDROCHLORIDE 10 MILLIGRAM(S): 5 TABLET ORAL at 19:20

## 2022-11-11 RX ADMIN — OXYCODONE HYDROCHLORIDE 10 MILLIGRAM(S): 5 TABLET ORAL at 18:28

## 2022-11-11 RX ADMIN — Medication 975 MILLIGRAM(S): at 23:00

## 2022-11-11 RX ADMIN — HYDROMORPHONE HYDROCHLORIDE 0.5 MILLIGRAM(S): 2 INJECTION INTRAMUSCULAR; INTRAVENOUS; SUBCUTANEOUS at 12:53

## 2022-11-11 NOTE — PATIENT PROFILE ADULT - FALL HARM RISK - UNIVERSAL INTERVENTIONS
Bed in lowest position, wheels locked, appropriate side rails in place/Call bell, personal items and telephone in reach/Instruct patient to call for assistance before getting out of bed or chair/Non-slip footwear when patient is out of bed/[place] to call system/Physically safe environment - no spills, clutter or unnecessary equipment/Purposeful Proactive Rounding/Room/bathroom lighting operational, light cord in reach

## 2022-11-11 NOTE — DISCHARGE NOTE PROVIDER - CARE PROVIDER_API CALL
Monico Miguel)  Orthopaedic Surgery  [address]  [place] [address]  Phone: [phone]  Fax: [phone]  Follow Up Time:

## 2022-11-11 NOTE — PHYSICAL THERAPY INITIAL EVALUATION ADULT - CRITERIA FOR SKILLED THERAPEUTIC INTERVENTIONS
impairments found/functional limitations in following categories/risk reduction/prevention/rehab potential/therapy frequency/predicted duration of therapy intervention/anticipated equipment needs at discharge/anticipated discharge recommendation normal...

## 2022-11-11 NOTE — DISCHARGE NOTE PROVIDER - HOSPITAL COURSE
57yMale with history of Lumbar Radiculopathy presenting for ALIF L5-S1 by Dr. Miguel on 11/11/2022. Risk and benefits of surgery were explained to the patient. The patient understood and agreed to proceed with surgery. Patient underwent the procedure with no intraoperative complications. Pt was brought in stable condition to the PACU. Once stable in PACU, pt was brought to the floor. During hospital stay pt was followed by Medicine, physical therapy, Home Care during this admission. Pt had an uneventful hospital course. Pt is stable for discharge to home

## 2022-11-11 NOTE — PROGRESS NOTE ADULT - SUBJECTIVE AND OBJECTIVE BOX
post op note  57yMale s/p ALIF L5-S1 under general anesthesia. Pt tolerated procedure well, without any intra-op complications. Pt  in NAD. Pain controlled. Pt denies any new complaints. Pt denies CP/SOB/N/V/D/numbness/tingling/bowel or bladder dysfunction.     PE:     Abdomen: softly distended, Dressing c/d/i   B/L UE: Skin intact. +ROM shoulder/elbow/wrist/fingers. +ok/thumbsup/fingercross signs.  strength: 5/5.  RP2+ NVI.   B/L LE: Skin intact. +ROM hip/knee/ankle/toes. Ankle Dorsi/plantarflexion: 5/5. Calf: soft, compressible and nontender. DP/PT 2+ NVI.                             13.7   12.27 )-----------( 134      ( 11 Nov 2022 12:05 )             43.0       11-11    141  |  108  |  22  ----------------------------<  105<H>  4.8   |  28  |  1.75<H>    Ca    8.5      11 Nov 2022 12:05          A/P: 57yMale s/p  ALIF L5-S1   Bettencourt discontinues in PACU, Void trial  Pain control prn  PT: WBAT - spinal precautions   DVT ppx: SCDs and ambulation  Wound care, Isometric exercises, incentive spirometry   Discharge: planning Home once pain controlled, tolerating PO Diet and have Bowel movement.  All the above discussed and understood by pt

## 2022-11-11 NOTE — PHYSICAL THERAPY INITIAL EVALUATION ADULT - AMBULATION SKILLS, REHAB EVAL
SAC and shopping cart- used SAC indoors and to walk from front door to car in driveways- wobbly withSAC use. Used shopping cart in stores . Reports difficulty in descending stairs/independent/needs device

## 2022-11-11 NOTE — DISCHARGE NOTE PROVIDER - NSDCMRMEDTOKEN_GEN_ALL_CORE_FT
Aspir 81 oral delayed release tablet: 1 tab(s) orally once a day  atorvastatin 20 mg oral tablet: 1 tab(s) orally once a day  oxyCODONE 10 mg oral tablet, extended release:   pregabalin: 1 tab(s) orally 3 times a day   Aspir 81 oral delayed release tablet: 1 tab(s) orally once a day  atorvastatin 20 mg oral tablet: 1 tab(s) orally once a day  Colace 100 mg oral capsule: 1 cap(s) orally 3 times a day   ondansetron 4 mg oral tablet: 1 tab(s) orally 3 times a day   oxyCODONE 5 mg oral tablet: 1 tab(s) orally every 4 hours, As Needed MDD:6  pregabalin: 1 tab(s) orally 3 times a day

## 2022-11-11 NOTE — DISCHARGE NOTE PROVIDER - NSDCCPCAREPLAN_GEN_ALL_CORE_FT
PRINCIPAL DISCHARGE DIAGNOSIS  Diagnosis: Lumbar radiculopathy  Assessment and Plan of Treatment:       SECONDARY DISCHARGE DIAGNOSES  Diagnosis: Bettencourt catheter problem  Assessment and Plan of Treatment:

## 2022-11-11 NOTE — DISCHARGE NOTE PROVIDER - NSDCFUADDAPPT_GEN_ALL_CORE_FT
Follow up with your surgeon in two weeks. Call for appointment.    If you need more pain medications, call your surgeon's office. For medication refills or authorizations call [phone]    Call and schedule a follow up appointment with your primary care physician for repeat blood work (CBC and BMP) for post hospital discharge follow-up care.    Call your surgeon if you have increased redness/pain/drainage or fever. Return to ER for shortness of breath/calf tenderness.

## 2022-11-11 NOTE — PATIENT PROFILE ADULT - NSPROPTRIGHTCAREGIVER_GEN_A_NUR
[FreeTextEntry1] : Mild intermittent asthma. Well controlled for many years. Last exacerbation as a child. Ok to discontinue Symbicort and use albuterol only as needed. Paperwork filled. No need for routine follow up unless he develops symptoms. \par  yes

## 2022-11-11 NOTE — PHYSICAL THERAPY INITIAL EVALUATION ADULT - GENERAL OBSERVATIONS, REHAB EVAL
Pt recd supine NAD . C/o history of b/l knee buckling and LLE weaker than the right. Pt has h/o CX spine sx 2 years ago.  Dressing on back & abdomen C/D/I   Spinal precautions reviewed

## 2022-11-11 NOTE — DISCHARGE NOTE PROVIDER - NSDCCPTREATMENT_GEN_ALL_CORE_FT
PRINCIPAL PROCEDURE  Procedure: ALIF, 1 level  Findings and Treatment: L5-S1      SECONDARY PROCEDURE  Procedure: Insertion of indwelling Bettencourt catheter  Findings and Treatment:

## 2022-11-12 ENCOUNTER — TRANSCRIPTION ENCOUNTER (OUTPATIENT)
Age: 57
End: 2022-11-12

## 2022-11-12 LAB
ANION GAP SERPL CALC-SCNC: 4 MMOL/L — LOW (ref 5–17)
BASOPHILS # BLD AUTO: 0.05 K/UL — SIGNIFICANT CHANGE UP (ref 0–0.2)
BASOPHILS NFR BLD AUTO: 0.3 % — SIGNIFICANT CHANGE UP (ref 0–2)
BUN SERPL-MCNC: 26 MG/DL — HIGH (ref 7–23)
CALCIUM SERPL-MCNC: 8.7 MG/DL — SIGNIFICANT CHANGE UP (ref 8.5–10.1)
CHLORIDE SERPL-SCNC: 105 MMOL/L — SIGNIFICANT CHANGE UP (ref 96–108)
CO2 SERPL-SCNC: 28 MMOL/L — SIGNIFICANT CHANGE UP (ref 22–31)
CREAT SERPL-MCNC: 1.79 MG/DL — HIGH (ref 0.5–1.3)
EGFR: 43 ML/MIN/1.73M2 — LOW
EOSINOPHIL # BLD AUTO: 0.07 K/UL — SIGNIFICANT CHANGE UP (ref 0–0.5)
EOSINOPHIL NFR BLD AUTO: 0.4 % — SIGNIFICANT CHANGE UP (ref 0–6)
GLUCOSE SERPL-MCNC: 137 MG/DL — HIGH (ref 70–99)
HCT VFR BLD CALC: 43.6 % — SIGNIFICANT CHANGE UP (ref 39–50)
HGB BLD-MCNC: 13.6 G/DL — SIGNIFICANT CHANGE UP (ref 13–17)
IMM GRANULOCYTES NFR BLD AUTO: 1.4 % — HIGH (ref 0–0.9)
LYMPHOCYTES # BLD AUTO: 0.89 K/UL — LOW (ref 1–3.3)
LYMPHOCYTES # BLD AUTO: 5.7 % — LOW (ref 13–44)
MCHC RBC-ENTMCNC: 30.2 PG — SIGNIFICANT CHANGE UP (ref 27–34)
MCHC RBC-ENTMCNC: 31.2 G/DL — LOW (ref 32–36)
MCV RBC AUTO: 96.9 FL — SIGNIFICANT CHANGE UP (ref 80–100)
MONOCYTES # BLD AUTO: 1.52 K/UL — HIGH (ref 0–0.9)
MONOCYTES NFR BLD AUTO: 9.7 % — SIGNIFICANT CHANGE UP (ref 2–14)
NEUTROPHILS # BLD AUTO: 12.92 K/UL — HIGH (ref 1.8–7.4)
NEUTROPHILS NFR BLD AUTO: 82.5 % — HIGH (ref 43–77)
NRBC # BLD: 0 /100 WBCS — SIGNIFICANT CHANGE UP (ref 0–0)
PLATELET # BLD AUTO: 153 K/UL — SIGNIFICANT CHANGE UP (ref 150–400)
POTASSIUM SERPL-MCNC: 5 MMOL/L — SIGNIFICANT CHANGE UP (ref 3.5–5.3)
POTASSIUM SERPL-SCNC: 5 MMOL/L — SIGNIFICANT CHANGE UP (ref 3.5–5.3)
RBC # BLD: 4.5 M/UL — SIGNIFICANT CHANGE UP (ref 4.2–5.8)
RBC # FLD: 15.5 % — HIGH (ref 10.3–14.5)
SODIUM SERPL-SCNC: 137 MMOL/L — SIGNIFICANT CHANGE UP (ref 135–145)
WBC # BLD: 15.67 K/UL — HIGH (ref 3.8–10.5)
WBC # FLD AUTO: 15.67 K/UL — HIGH (ref 3.8–10.5)

## 2022-11-12 RX ORDER — ACETAMINOPHEN 500 MG
1000 TABLET ORAL ONCE
Refills: 0 | Status: DISCONTINUED | OUTPATIENT
Start: 2022-11-12 | End: 2022-11-13

## 2022-11-12 RX ORDER — LANOLIN ALCOHOL/MO/W.PET/CERES
3 CREAM (GRAM) TOPICAL AT BEDTIME
Refills: 0 | Status: DISCONTINUED | OUTPATIENT
Start: 2022-11-12 | End: 2022-11-13

## 2022-11-12 RX ADMIN — TAMSULOSIN HYDROCHLORIDE 0.8 MILLIGRAM(S): 0.4 CAPSULE ORAL at 22:11

## 2022-11-12 RX ADMIN — OXYCODONE HYDROCHLORIDE 10 MILLIGRAM(S): 5 TABLET ORAL at 06:46

## 2022-11-12 RX ADMIN — Medication 975 MILLIGRAM(S): at 22:10

## 2022-11-12 RX ADMIN — Medication 975 MILLIGRAM(S): at 23:10

## 2022-11-12 RX ADMIN — POLYETHYLENE GLYCOL 3350 17 GRAM(S): 17 POWDER, FOR SOLUTION ORAL at 05:58

## 2022-11-12 RX ADMIN — Medication 100 MILLIGRAM(S): at 22:11

## 2022-11-12 RX ADMIN — Medication 975 MILLIGRAM(S): at 06:45

## 2022-11-12 RX ADMIN — Medication 975 MILLIGRAM(S): at 05:58

## 2022-11-12 RX ADMIN — OXYCODONE HYDROCHLORIDE 10 MILLIGRAM(S): 5 TABLET ORAL at 05:58

## 2022-11-12 RX ADMIN — Medication 100 MILLIGRAM(S): at 15:05

## 2022-11-12 RX ADMIN — OXYCODONE HYDROCHLORIDE 10 MILLIGRAM(S): 5 TABLET ORAL at 18:17

## 2022-11-12 RX ADMIN — Medication 100 MILLIGRAM(S): at 00:47

## 2022-11-12 RX ADMIN — OXYCODONE HYDROCHLORIDE 10 MILLIGRAM(S): 5 TABLET ORAL at 17:13

## 2022-11-12 RX ADMIN — POLYETHYLENE GLYCOL 3350 17 GRAM(S): 17 POWDER, FOR SOLUTION ORAL at 17:13

## 2022-11-12 RX ADMIN — Medication 975 MILLIGRAM(S): at 15:05

## 2022-11-12 RX ADMIN — Medication 100 MILLIGRAM(S): at 05:58

## 2022-11-12 RX ADMIN — Medication 975 MILLIGRAM(S): at 16:00

## 2022-11-12 RX ADMIN — CYCLOBENZAPRINE HYDROCHLORIDE 10 MILLIGRAM(S): 10 TABLET, FILM COATED ORAL at 15:04

## 2022-11-12 RX ADMIN — ATORVASTATIN CALCIUM 20 MILLIGRAM(S): 80 TABLET, FILM COATED ORAL at 22:10

## 2022-11-12 NOTE — DISCHARGE NOTE NURSING/CASE MANAGEMENT/SOCIAL WORK - NSDCPEFALRISK_GEN_ALL_CORE
For information on Fall & Injury Prevention, visit: [email or web address] OR  [email or web address] OR  https://www.cdc.gov/steadi/patient.html

## 2022-11-12 NOTE — DISCHARGE NOTE NURSING/CASE MANAGEMENT/SOCIAL WORK - NSDCFUADDAPPT_GEN_ALL_CORE_FT
Follow up with your surgeon in two weeks. Call for appointment.    If you need more pain medications, call your surgeon's office. For medication refills or authorizations call [phone]    Call and schedule a follow up appointment with your primary care physician for repeat blood work (CBC and BMP) for post hospital discharge follow-up care.    Call your surgeon if you have increased redness/pain/drainage or fever. Return to ER for shortness of breath/calf tenderness.

## 2022-11-12 NOTE — PROGRESS NOTE ADULT - SUBJECTIVE AND OBJECTIVE BOX
Patient seen and examined at bedside. No acute complaints at this time. Pain well controlled. Denies weakness, numbness or tingling. Denies chest pain, shortness of breath, nausea or vomiting. Pt with hx of urethra stricture, bladder scan overnight 770cc, passed 350cc urine on own since surgery.     PE:  Vital Signs Last 24 Hrs  T(C): 36.5 (11-12-22 @ 06:15), Max: 36.8 (11-11-22 @ 17:17)  T(F): 97.7 (11-12-22 @ 06:15), Max: 98.3 (11-11-22 @ 17:17)  HR: 77 (11-12-22 @ 06:15) (77 - 107)  BP: 108/71 (11-12-22 @ 06:15) (91/71 - 127/86)  BP(mean): --  RR: 17 (11-12-22 @ 06:15) (13 - 18)  SpO2: 95% (11-12-22 @ 06:15) (88% - 98%)    General: NAD, resting comfortably in bed  Spine   Dressing C/D/I  + DP Pulses    Motor:                   C5                C6              C7               C8           T1   R             5/5                5/5            5/5              5/5          5/5  L             5/5                5/5            5/5              5/5          5/5                    L2                  L3             L4              L5            S1  R            4/5                5/5             5/5            5/5          4/5  L             5/5                5/5            5/5            5/5          5/5    Sensory:            C5         C6         C7      C8       T1        (0=absent, 1=impaired, 2=normal, NT=not testable)  R         2            2           2        2         2  L          2            2           2        2         2               L2          L3         L4      L5       S1         (0=absent, 1=impaired, 2=normal, NT=not testable)  R         2            2            2        2        2  L          2            2           2        2         2      (-) Hernandez b/l  (+) Clonus R  (-) Clonus L   (-) Babinski b/l                        13.7   12.27 )-----------( 134      ( 11 Nov 2022 12:05 )             43.0     11 Nov 2022 12:05    141    |  108    |  22     ----------------------------<  105    4.8     |  28     |  1.75     Ca    8.5        11 Nov 2022 12:05          A/P:  57y m s/p ALIF L5-S1 POD 1  -PT/OT  -WBAT  - FU urine output, FU bladder scan as needed  -Pain Control prn  -DVT ppx   -Continue perioperative abx x 24 hours  -FU AM Labs  -Incentive Spirometry  -Medical management appreciated Patient seen and examined at bedside. No acute complaints at this time. Pain well controlled. Denies weakness, numbness or tingling. Denies chest pain, shortness of breath, nausea or vomiting. Pt with hx of urethra stricture, bladder scan overnight 770cc, passed 350cc urine on own since surgery.     PE:  Vital Signs Last 24 Hrs  T(C): 36.5 (11-12-22 @ 06:15), Max: 36.8 (11-11-22 @ 17:17)  T(F): 97.7 (11-12-22 @ 06:15), Max: 98.3 (11-11-22 @ 17:17)  HR: 77 (11-12-22 @ 06:15) (77 - 107)  BP: 108/71 (11-12-22 @ 06:15) (91/71 - 127/86)  BP(mean): --  RR: 17 (11-12-22 @ 06:15) (13 - 18)  SpO2: 95% (11-12-22 @ 06:15) (88% - 98%)    General: NAD, resting comfortably in bed  Spine   Dressing C/D/I  + DP Pulses    Motor:                   C5                C6              C7               C8           T1   R             5/5                5/5            5/5              5/5          5/5  L             5/5                5/5            5/5              5/5          5/5                    L2                  L3             L4              L5            S1  R            4/5                5/5             5/5            5/5          4/5  L             5/5                5/5            5/5            5/5          5/5    Sensory:            C5         C6         C7      C8       T1        (0=absent, 1=impaired, 2=normal, NT=not testable)  R         2            2           2        2         2  L          2            2           2        2         2               L2          L3         L4      L5       S1         (0=absent, 1=impaired, 2=normal, NT=not testable)  R         2            2            2        2        2  L          2            2           2        2         2      (-) Hernandez b/l  (+) Clonus R  (-) Clonus L   (-) Babinski b/l                        13.7   12.27 )-----------( 134      ( 11 Nov 2022 12:05 )             43.0     11 Nov 2022 12:05    141    |  108    |  22     ----------------------------<  105    4.8     |  28     |  1.75     Ca    8.5        11 Nov 2022 12:05          A/P:  57y m s/p ALIF L5-S1 POD 1  -PT/OT  -WBAT  - FU urine output, FU bladder scan as needed  -Pain Control prn  -DVT ppx   -FU AM Labs  -Incentive Spirometry  -Dispo home

## 2022-11-12 NOTE — DISCHARGE NOTE NURSING/CASE MANAGEMENT/SOCIAL WORK - PATIENT PORTAL LINK FT
You can access the FollowMyHealth Patient Portal offered by [facility] by registering at the following website: [email or web address] By joining [email or web address]’s FollowMyHealth portal, you will also be able to view your health information using other applications (apps) compatible with our system.

## 2022-11-13 VITALS
DIASTOLIC BLOOD PRESSURE: 85 MMHG | TEMPERATURE: 98 F | RESPIRATION RATE: 18 BRPM | OXYGEN SATURATION: 96 % | HEART RATE: 100 BPM | SYSTOLIC BLOOD PRESSURE: 136 MMHG

## 2022-11-13 LAB
ANION GAP SERPL CALC-SCNC: 4 MMOL/L — LOW (ref 5–17)
BASOPHILS # BLD AUTO: 0.09 K/UL — SIGNIFICANT CHANGE UP (ref 0–0.2)
BASOPHILS NFR BLD AUTO: 0.6 % — SIGNIFICANT CHANGE UP (ref 0–2)
BUN SERPL-MCNC: 27 MG/DL — HIGH (ref 7–23)
CALCIUM SERPL-MCNC: 8.8 MG/DL — SIGNIFICANT CHANGE UP (ref 8.5–10.1)
CHLORIDE SERPL-SCNC: 108 MMOL/L — SIGNIFICANT CHANGE UP (ref 96–108)
CO2 SERPL-SCNC: 27 MMOL/L — SIGNIFICANT CHANGE UP (ref 22–31)
CREAT SERPL-MCNC: 1.59 MG/DL — HIGH (ref 0.5–1.3)
EGFR: 50 ML/MIN/1.73M2 — LOW
EOSINOPHIL # BLD AUTO: 0.27 K/UL — SIGNIFICANT CHANGE UP (ref 0–0.5)
EOSINOPHIL NFR BLD AUTO: 1.9 % — SIGNIFICANT CHANGE UP (ref 0–6)
GLUCOSE SERPL-MCNC: 104 MG/DL — HIGH (ref 70–99)
HCT VFR BLD CALC: 42.6 % — SIGNIFICANT CHANGE UP (ref 39–50)
HGB BLD-MCNC: 13.6 G/DL — SIGNIFICANT CHANGE UP (ref 13–17)
IMM GRANULOCYTES NFR BLD AUTO: 2.5 % — HIGH (ref 0–0.9)
LYMPHOCYTES # BLD AUTO: 1.94 K/UL — SIGNIFICANT CHANGE UP (ref 1–3.3)
LYMPHOCYTES # BLD AUTO: 13.7 % — SIGNIFICANT CHANGE UP (ref 13–44)
MCHC RBC-ENTMCNC: 30.1 PG — SIGNIFICANT CHANGE UP (ref 27–34)
MCHC RBC-ENTMCNC: 31.9 G/DL — LOW (ref 32–36)
MCV RBC AUTO: 94.2 FL — SIGNIFICANT CHANGE UP (ref 80–100)
MONOCYTES # BLD AUTO: 2.04 K/UL — HIGH (ref 0–0.9)
MONOCYTES NFR BLD AUTO: 14.4 % — HIGH (ref 2–14)
NEUTROPHILS # BLD AUTO: 9.5 K/UL — HIGH (ref 1.8–7.4)
NEUTROPHILS NFR BLD AUTO: 66.9 % — SIGNIFICANT CHANGE UP (ref 43–77)
NRBC # BLD: 0 /100 WBCS — SIGNIFICANT CHANGE UP (ref 0–0)
PLATELET # BLD AUTO: 148 K/UL — LOW (ref 150–400)
POTASSIUM SERPL-MCNC: 4.6 MMOL/L — SIGNIFICANT CHANGE UP (ref 3.5–5.3)
POTASSIUM SERPL-SCNC: 4.6 MMOL/L — SIGNIFICANT CHANGE UP (ref 3.5–5.3)
RBC # BLD: 4.52 M/UL — SIGNIFICANT CHANGE UP (ref 4.2–5.8)
RBC # FLD: 15.9 % — HIGH (ref 10.3–14.5)
SODIUM SERPL-SCNC: 139 MMOL/L — SIGNIFICANT CHANGE UP (ref 135–145)
WBC # BLD: 14.19 K/UL — HIGH (ref 3.8–10.5)
WBC # FLD AUTO: 14.19 K/UL — HIGH (ref 3.8–10.5)

## 2022-11-13 RX ORDER — OXYCODONE HYDROCHLORIDE 5 MG/1
0 TABLET ORAL
Qty: 0 | Refills: 0 | DISCHARGE

## 2022-11-13 RX ORDER — OXYCODONE HYDROCHLORIDE 5 MG/1
1 TABLET ORAL
Qty: 30 | Refills: 0
Start: 2022-11-13 | End: 2022-11-17

## 2022-11-13 RX ORDER — DOCUSATE SODIUM 100 MG
1 CAPSULE ORAL
Qty: 21 | Refills: 0
Start: 2022-11-13 | End: 2022-11-19

## 2022-11-13 RX ORDER — ONDANSETRON 8 MG/1
1 TABLET, FILM COATED ORAL
Qty: 30 | Refills: 0
Start: 2022-11-13 | End: 2022-11-22

## 2022-11-13 RX ADMIN — OXYCODONE HYDROCHLORIDE 10 MILLIGRAM(S): 5 TABLET ORAL at 06:12

## 2022-11-13 RX ADMIN — Medication 81 MILLIGRAM(S): at 11:48

## 2022-11-13 RX ADMIN — CYCLOBENZAPRINE HYDROCHLORIDE 10 MILLIGRAM(S): 10 TABLET, FILM COATED ORAL at 16:45

## 2022-11-13 RX ADMIN — Medication 100 MILLIGRAM(S): at 05:12

## 2022-11-13 RX ADMIN — POLYETHYLENE GLYCOL 3350 17 GRAM(S): 17 POWDER, FOR SOLUTION ORAL at 05:13

## 2022-11-13 RX ADMIN — Medication 100 MILLIGRAM(S): at 16:54

## 2022-11-13 RX ADMIN — Medication 975 MILLIGRAM(S): at 16:45

## 2022-11-13 RX ADMIN — OXYCODONE HYDROCHLORIDE 10 MILLIGRAM(S): 5 TABLET ORAL at 05:12

## 2022-11-13 NOTE — CONSULT NOTE ADULT - CONSULT REQUESTED BY NAME
Dr. Miguel

## 2022-11-13 NOTE — PROGRESS NOTE ADULT - SUBJECTIVE AND OBJECTIVE BOX
POD#2  Tolerating diet.  Difficulty with urinating. PVR 700cc.  Denies abdominal pain.  Ambulating with walker.    Afeb VSS  Abdomen is softly distended and nontender.  Left abdominal dressing is dry.  No leg edema or tenderness.  Pedal pulses are palpable.    Rec: Urological evaluation.            Supportive care/ambulation.

## 2022-11-13 NOTE — PROGRESS NOTE ADULT - SUBJECTIVE AND OBJECTIVE BOX
Patient seen and examined at bedside. No acute complaints at this time. Pain well controlled. Denies weakness, numbness or tingling. Denies chest pain, shortness of breath, nausea or vomiting. Pt with hx of urethra stricture, most recent bladder scan 750cc, passing small volumes of urine on own. Surgery consulted for need to place straight cath. Pt refused and states he would like to urinate on his own and states "you would have to knock me out to put in a catheter". Surgery planning to see the patient again in a few hours to re-evaluate and discuss options with patients. Possible need for bedside dilation. Otherwise no acute orthopedic complaints at this time.      PE:  Vital Signs Last 24 Hrs  T(C): 36.5 (11-12-22 @ 06:15), Max: 36.8 (11-11-22 @ 17:17)  T(F): 97.7 (11-12-22 @ 06:15), Max: 98.3 (11-11-22 @ 17:17)  HR: 77 (11-12-22 @ 06:15) (77 - 107)  BP: 108/71 (11-12-22 @ 06:15) (91/71 - 127/86)  BP(mean): --  RR: 17 (11-12-22 @ 06:15) (13 - 18)  SpO2: 95% (11-12-22 @ 06:15) (88% - 98%)    General: NAD, mildly distressed, attempting to urinate with little success  Spine   Dressing C/D/I  + DP Pulses    Motor:                   C5                C6              C7               C8           T1   R             5/5                5/5            5/5              5/5          5/5  L             5/5                5/5            5/5              5/5          5/5                    L2                  L3             L4              L5            S1  R            4/5                5/5             5/5            4/5          4/5  L             5/5                5/5            5/5            5/5          5/5    Sensory:            C5         C6         C7      C8       T1        (0=absent, 1=impaired, 2=normal, NT=not testable)  R         2            2           2        2         2  L          2            2           2        2         2               L2          L3         L4      L5       S1         (0=absent, 1=impaired, 2=normal, NT=not testable)  R         2            2            2        2        2  L          2            2           2        2         2      (-) Hernandez b/l  (+) Clonus R  (-) Clonus L   (-) Babinski b/l                        13.7   12.27 )-----------( 134      ( 11 Nov 2022 12:05 )             43.0     11 Nov 2022 12:05    141    |  108    |  22     ----------------------------<  105    4.8     |  28     |  1.75     Ca    8.5        11 Nov 2022 12:05          A/P:  57y m s/p ALIF L5-S1 POD 2  -PT/OT  -WBAT  - FU urine output, FU bladder scan as needed, possible need for cath, surgery to re-evaluate   -Pain Control prn  -FU AM Labs  -Incentive Spirometry  - dispo home pending passing urine

## 2022-11-22 ENCOUNTER — APPOINTMENT (OUTPATIENT)
Dept: ORTHOPEDIC SURGERY | Facility: CLINIC | Age: 57
End: 2022-11-22

## 2022-11-22 PROBLEM — F41.9 ANXIETY DISORDER, UNSPECIFIED: Chronic | Status: ACTIVE | Noted: 2022-11-11

## 2022-11-22 PROBLEM — E78.5 HYPERLIPIDEMIA, UNSPECIFIED: Chronic | Status: ACTIVE | Noted: 2022-11-11

## 2022-11-22 PROBLEM — I10 ESSENTIAL (PRIMARY) HYPERTENSION: Chronic | Status: ACTIVE | Noted: 2022-11-11

## 2022-11-22 PROCEDURE — 20974 ESTIM AID BONE HEALG N-INVAS: CPT

## 2022-11-22 PROCEDURE — 99024 POSTOP FOLLOW-UP VISIT: CPT

## 2022-11-22 PROCEDURE — 72100 X-RAY EXAM L-S SPINE 2/3 VWS: CPT

## 2022-11-22 NOTE — PHYSICAL EXAM
[Implants in position] : Implants in position [No loosening of hardware] : No loosening of hardware [Fusion intact] : Fusion intact [de-identified] : Inspection:    (-) Abnormal alignment (kyphosis/lordosis/scoliosis)   (-) Atrophy\par \par Anterior and posterior incisions clean, dry and intact. No evidence of infection. \par \par ROM:\par \par    Pain:           (+) Flexion/extension     (+) Rotation\par    Stiffness:   (+) Flexion/extension     (+) Rotation\par \par                       (-) Hamstring tightness\par \par Tenderness/Spasm:          \par \par    Lumbar paraspinal:          (-) Right    (-) Left    (-) Midline\par    Thoracic paraspinal:        (-) Right    (-) Left    (-) Midline\par    PSIS:                                (-) Right    (-) Left\par    SI joint:                             (-) Right    (-) Left\par    Greater troch:                  (-) Right    (-) Left\par \par Strength: (out of 5)\par \par    Illiopsoas:           Right: 5   .    Left: 5\par    Quad:                 Right: 5   .    Left: 5\par    Hamstrings:        Right: 5   .    Left: 5\par    Anterior tibialis:  Right: 5    .   Left: 5\par    Gastrocsoleus:  Right: 5    .   Left: 5\par    EHL:                    Right: 5    .   Left: 5\par \par Neuro: DTR's wnl.  Sensation to light touch grossly in tact in all distributions.\par \par    (-) SLR\par    (-) Femoral stretch\par \par Vascularity: Extremity warm and well perfused\par \par Gait: normal\par \par \par

## 2022-11-22 NOTE — ASSESSMENT
[FreeTextEntry1] : Staples removed, steri's applied. \par \par Pt. provided and instructed on use of bone stimulator; fitted for device also\par \par F/U 3-4 weeks.

## 2022-11-22 NOTE — HISTORY OF PRESENT ILLNESS
[Lower back] : lower back [Gradual] : gradual [7] : 7 [Dull/Aching] : dull/aching [Sharp] : sharp [Shooting] : shooting [Constant] : constant [Retired] : Work status: retired [de-identified] : had partial and transient relief with injections and MBB\par pain shooting down the legs;   has done PT directed exercises at home for months ;  completed a formal course of PT in the years and months past\par \par 11/22/22: f/u lumbar spine, 1st post-op (11/11/22), doing well. Ambulating with a walker. Post-op pain well managed. No fevers, chills, sweats.  [] : no [FreeTextEntry5] : pt states he has been experiencing pain on his  lower back for a while, pt states he has surgery on his neck 2 years ago  [FreeTextEntry7] : left leg/ calf  [de-identified] : 11/11/2022 [de-identified] : Posterior spinal instrumentation L5-S1

## 2022-11-30 ENCOUNTER — FORM ENCOUNTER (OUTPATIENT)
Age: 57
End: 2022-11-30

## 2022-12-06 ENCOUNTER — FORM ENCOUNTER (OUTPATIENT)
Age: 57
End: 2022-12-06

## 2022-12-07 ENCOUNTER — RESULT REVIEW (OUTPATIENT)
Age: 57
End: 2022-12-07

## 2022-12-13 ENCOUNTER — APPOINTMENT (OUTPATIENT)
Dept: ORTHOPEDIC SURGERY | Facility: CLINIC | Age: 57
End: 2022-12-13

## 2022-12-13 VITALS — WEIGHT: 195 LBS | BODY MASS INDEX: 30.61 KG/M2 | HEIGHT: 67 IN

## 2022-12-13 PROCEDURE — 72100 X-RAY EXAM L-S SPINE 2/3 VWS: CPT

## 2022-12-13 PROCEDURE — 99024 POSTOP FOLLOW-UP VISIT: CPT

## 2022-12-13 NOTE — HISTORY OF PRESENT ILLNESS
[Lower back] : lower back [Gradual] : gradual [7] : 7 [Dull/Aching] : dull/aching [Sharp] : sharp [Shooting] : shooting [Constant] : constant [Retired] : Work status: retired [de-identified] : had partial and transient relief with injections and MBB\par pain shooting down the legs;   has done PT directed exercises at home for months ;  completed a formal course of PT in the years and months past\par \par 11/22/22: f/u lumbar spine, 1st post-op (11/11/22), doing well. Ambulating with a walker. Post-op pain well managed. No fevers, chills, sweats. \par \par 12-13-22- continues to improve and ambulate better. He is using the bone stim, and walker for ambulation. for f/u xrays [] : no [FreeTextEntry5] : pt states he has been experiencing pain on his  lower back for a while, pt states he has surgery on his neck 2 years ago  [FreeTextEntry7] : left leg/ calf  [de-identified] : 11/11/2022 [de-identified] : Posterior spinal instrumentation L5-S1

## 2022-12-13 NOTE — PHYSICAL EXAM
[Implants in position] : Implants in position [No loosening of hardware] : No loosening of hardware [Fusion intact] : Fusion intact [de-identified] : Inspection:    (-) Abnormal alignment (kyphosis/lordosis/scoliosis)   (-) Atrophy\par \par Anterior and posterior incisions clean, dry and intact. No evidence of infection. \par \par ROM:\par \par    Pain:           (+) Flexion/extension     (+) Rotation\par    Stiffness:   (+) Flexion/extension     (+) Rotation\par \par                       (-) Hamstring tightness\par \par Tenderness/Spasm:          \par \par    Lumbar paraspinal:          (-) Right    (-) Left    (-) Midline\par    Thoracic paraspinal:        (-) Right    (-) Left    (-) Midline\par    PSIS:                                (-) Right    (-) Left\par    SI joint:                             (-) Right    (-) Left\par    Greater troch:                  (-) Right    (-) Left\par \par Strength: (out of 5)\par \par    Illiopsoas:           Right: 5   .    Left: 5\par    Quad:                 Right: 5   .    Left: 5\par    Hamstrings:        Right: 5   .    Left: 5\par    Anterior tibialis:  Right: 5    .   Left: 5\par    Gastrocsoleus:  Right: 5    .   Left: 5\par    EHL:                    Right: 5    .   Left: 5\par \par Neuro: DTR's wnl.  Sensation to light touch grossly in tact in all distributions.\par \par    (-) SLR\par    (-) Femoral stretch\par \par Vascularity: Extremity warm and well perfused\par \par Gait: normal\par \par \par

## 2022-12-13 NOTE — ASSESSMENT
[FreeTextEntry1] : Staples removed, steri's applied. \par \par Pt. provided and instructed on use of bone stimulator; fitted for device also\par \par F/U 3-4 weeks. \par \par 12-13-22- continue with the bone stim, start therapy at next visit in 3 weeks

## 2022-12-14 RX ORDER — OXYCODONE 5 MG/1
5 TABLET ORAL
Qty: 10 | Refills: 0 | Status: DISCONTINUED | COMMUNITY
Start: 2022-01-25 | End: 2022-12-14

## 2022-12-27 ENCOUNTER — FORM ENCOUNTER (OUTPATIENT)
Age: 57
End: 2022-12-27

## 2022-12-28 ENCOUNTER — FORM ENCOUNTER (OUTPATIENT)
Age: 57
End: 2022-12-28

## 2022-12-29 ENCOUNTER — FORM ENCOUNTER (OUTPATIENT)
Age: 57
End: 2022-12-29

## 2023-01-05 ENCOUNTER — APPOINTMENT (OUTPATIENT)
Dept: ORTHOPEDIC SURGERY | Facility: CLINIC | Age: 58
End: 2023-01-05
Payer: MEDICARE

## 2023-01-05 VITALS — BODY MASS INDEX: 30.61 KG/M2 | HEIGHT: 67 IN | WEIGHT: 195 LBS

## 2023-01-05 PROCEDURE — 72100 X-RAY EXAM L-S SPINE 2/3 VWS: CPT

## 2023-01-05 PROCEDURE — 99024 POSTOP FOLLOW-UP VISIT: CPT

## 2023-01-05 NOTE — HISTORY OF PRESENT ILLNESS
[Lower back] : lower back [Gradual] : gradual [7] : 7 [Dull/Aching] : dull/aching [Sharp] : sharp [Shooting] : shooting [Constant] : constant [Retired] : Work status: retired [de-identified] : s/p L5-S1 fusion 11/11/22; currently being treated with antibiotics for bilateral LE cellulitis. taking oxycodone and lyrica. using walking. back pain flared up since cellutitis but improved.  [] : no [FreeTextEntry5] : pt states he has been experiencing pain on his  lower back for a while, pt states he has surgery on his neck 2 years ago  [FreeTextEntry7] : left leg/ calf  [de-identified] : 11/11/2022 [de-identified] : Posterior spinal instrumentation L5-S1

## 2023-01-05 NOTE — ASSESSMENT
[FreeTextEntry1] : 7 weeks s/p lumbar fusion. currently being managed for cellulitis with antibiotics. begin PT. continue meds, walker and bone stim. f/u 1 month.

## 2023-01-05 NOTE — PROCEDURE
[FreeTextEntry3] : Patient seen by Bessy Montiel PA-C acting as scribe under the supervision of Monico Miguel MD\par

## 2023-01-10 ENCOUNTER — FORM ENCOUNTER (OUTPATIENT)
Age: 58
End: 2023-01-10

## 2023-01-23 ENCOUNTER — FORM ENCOUNTER (OUTPATIENT)
Age: 58
End: 2023-01-23

## 2023-02-01 ENCOUNTER — FORM ENCOUNTER (OUTPATIENT)
Age: 58
End: 2023-02-01

## 2023-02-09 ENCOUNTER — APPOINTMENT (OUTPATIENT)
Dept: ORTHOPEDIC SURGERY | Facility: CLINIC | Age: 58
End: 2023-02-09
Payer: MEDICARE

## 2023-02-09 VITALS — WEIGHT: 195 LBS | HEIGHT: 67 IN | BODY MASS INDEX: 30.61 KG/M2

## 2023-02-09 PROCEDURE — 99214 OFFICE O/P EST MOD 30 MIN: CPT | Mod: 24

## 2023-02-09 PROCEDURE — 72100 X-RAY EXAM L-S SPINE 2/3 VWS: CPT

## 2023-02-09 NOTE — PHYSICAL EXAM
[] : motor exam is 5/5 throughout both lower extremities with normal tone [Implants in position] : Implants in position [No loosening of hardware] : No loosening of hardware [Fusion intact] : Fusion intact

## 2023-02-09 NOTE — HISTORY OF PRESENT ILLNESS
[Lower back] : lower back [Gradual] : gradual [7] : 7 [Dull/Aching] : dull/aching [Sharp] : sharp [Shooting] : shooting [Constant] : constant [Retired] : Work status: retired [de-identified] : s/p L5-S1 ALIF 11/11/22; continues to improve since surgery; had a bad flare up about 2 weeks ago but that has resolved; achiness in the knees but the preop radicular pain resolved; doing PT; using bone stim; taking pain meds prn [] : no [FreeTextEntry5] : Pt here for follow up of low back pain. States pain is still severe but PT has been helping with new stretching techniques. [FreeTextEntry7] : left leg/ calf  [de-identified] : 11/11/2022 [de-identified] : Posterior spinal instrumentation L5-S1

## 2023-02-09 NOTE — ASSESSMENT
[FreeTextEntry1] : about 3 months s/p lumbar fusion and doing well; the flare up from a few weeks ago likely from bone remodeling and fortunately has resolved; the preop radicular pain has resolved; continue PT and meds prn; construct stable on XR; continue bone stim

## 2023-02-14 ENCOUNTER — FORM ENCOUNTER (OUTPATIENT)
Age: 58
End: 2023-02-14

## 2023-02-16 ENCOUNTER — RESULT REVIEW (OUTPATIENT)
Age: 58
End: 2023-02-16

## 2023-02-16 ENCOUNTER — NON-APPOINTMENT (OUTPATIENT)
Age: 58
End: 2023-02-16

## 2023-03-08 ENCOUNTER — FORM ENCOUNTER (OUTPATIENT)
Age: 58
End: 2023-03-08

## 2023-03-16 ENCOUNTER — FORM ENCOUNTER (OUTPATIENT)
Age: 58
End: 2023-03-16

## 2023-03-19 ENCOUNTER — FORM ENCOUNTER (OUTPATIENT)
Age: 58
End: 2023-03-19

## 2023-03-30 ENCOUNTER — APPOINTMENT (OUTPATIENT)
Dept: ORTHOPEDIC SURGERY | Facility: CLINIC | Age: 58
End: 2023-03-30
Payer: MEDICARE

## 2023-03-30 VITALS — BODY MASS INDEX: 30.61 KG/M2 | HEIGHT: 67 IN | WEIGHT: 195 LBS

## 2023-03-30 PROCEDURE — 72100 X-RAY EXAM L-S SPINE 2/3 VWS: CPT

## 2023-03-30 PROCEDURE — 99214 OFFICE O/P EST MOD 30 MIN: CPT

## 2023-03-30 NOTE — PHYSICAL EXAM
[] : diminished ROM in all planes [Implants in position] : Implants in position [No loosening of hardware] : No loosening of hardware [Fusion intact] : Fusion intact

## 2023-04-18 NOTE — ASSESSMENT
[FreeTextEntry1] : about 4 months s/p lumbar fusion and doing well. He had an intense flair up of pain after an increase in activity, pain has started to resolve. His preop radicular pain has resolved. He will continue PT and meds prn; construct stable on XR; continue bone stim. discussed restrictions in regards to going back to the gym.

## 2023-04-18 NOTE — HISTORY OF PRESENT ILLNESS
[Lower back] : lower back [Gradual] : gradual [7] : 7 [Dull/Aching] : dull/aching [Sharp] : sharp [Shooting] : shooting [Constant] : constant [Retired] : Work status: retired [de-identified] : 3/20/23: Patient is s/p L5-S1 ALIF 11/11/22. He was doing well since surgery but had a severe flair up after doing yard work. Pain has started to resolved. He does report a constant achiness in the knees but the preop radicular pain resolved. He is currently doing PT and using bone stim. He is taking pain meds prn for pain relief.  [] : no [FreeTextEntry5] : Pt here for follow up of low back pain. Getting progressively better. Slightly swelling and tingling in left leg.  [FreeTextEntry7] : left leg/ calf  [de-identified] : 11/11/2022 [de-identified] : Posterior spinal instrumentation L5-S1

## 2023-04-27 ENCOUNTER — FORM ENCOUNTER (OUTPATIENT)
Age: 58
End: 2023-04-27

## 2023-04-30 ENCOUNTER — FORM ENCOUNTER (OUTPATIENT)
Age: 58
End: 2023-04-30

## 2023-05-30 ENCOUNTER — APPOINTMENT (OUTPATIENT)
Dept: ORTHOPEDIC SURGERY | Facility: CLINIC | Age: 58
End: 2023-05-30
Payer: MEDICARE

## 2023-05-30 PROCEDURE — 72100 X-RAY EXAM L-S SPINE 2/3 VWS: CPT

## 2023-05-30 NOTE — ASSESSMENT
[FreeTextEntry1] : about 6 months s/p lumbar fusion. Preop symptoms have improved. Pain when he does too much physical activity in his yard, discussed activity modification and less very strenuous work while he is still recovering . He is independent with HEP. construct stable on XR; would recommend he begin to wean off the pain medications; continue bone stim; f/u 3 months. \par \par Progress note completed by Bessy Montiel PA-C under the supervision of Monico Miguel MD

## 2023-05-30 NOTE — HISTORY OF PRESENT ILLNESS
[Lower back] : lower back [Gradual] : gradual [7] : 7 [Dull/Aching] : dull/aching [Sharp] : sharp [Shooting] : shooting [Constant] : constant [Retired] : Work status: retired [de-identified] : 3/20/23: Patient is s/p L5-S1 ALIF 11/11/22. He was doing well since surgery but had a severe flair up after doing yard work. Pain has started to resolved. He does report a constant achiness in the knees but the preop radicular pain resolved. He is currently doing PT and using bone stim. He is taking pain meds prn for pain relief.  [] : no [FreeTextEntry5] : Pt here for follow up of low back pain. Getting progressively better. Slightly swelling and tingling in left leg.  [FreeTextEntry7] : left leg/ calf  [de-identified] : 11/11/2022 [de-identified] : Posterior spinal instrumentation L5-S1

## 2023-06-13 ENCOUNTER — APPOINTMENT (OUTPATIENT)
Dept: PAIN MANAGEMENT | Facility: CLINIC | Age: 58
End: 2023-06-13

## 2023-06-13 DIAGNOSIS — M48.02 SPINAL STENOSIS, CERVICAL REGION: ICD-10-CM

## 2023-06-13 NOTE — HISTORY OF PRESENT ILLNESS
[FreeTextEntry1] : 08/24/2022: follow up today.  Had 100% relief from back pain from RFA.  Pain is returning in both legs.  He had no pain in legs for 1 week.  The last epidural only lasted 1 month.  Would like to consider surgery.\par \par 06/22/2022: follow up today after b/l L5/S1 TFESI on 6/8/22. he reports an overall improvement. now he has good days and bad days. Pain worse in the morning.  had lumbar MBB's in 2019 with 100% relief of his pain. ended up with a cervical myelopathy which put the lower back on hold \par \par 05/31/2022: follow up today.  Pain is returning in both legs.  Will schedule repeat.  \par \par 1/10/22: follow up after right L4/5 L5/S1 TFESI on 12/14/21. Had 80% relief so far. Has been having erectile\par dysfunction and urinary incontinence for last 6 months. Has improved a little since injection. Patient advised to follow up with urology.\par \par 11/24/21: follow up today to review MRI's. (11/22/21)Impression:\par 1. Multilevel lumbar and lower thoracic degenerative disc disease most pronounced at L3-4 and L5-S1.\par 2. Small right-sided disc herniation at L1-2 without stenosis or nerve root compression.\par 3. Disc bulges at L2-3 and L3-4 without stenosis or nerve root compression.\par 4. Disc bulge with a right foraminal disc herniation at L4-5 with mild compression of the right L4 nerve root.\par 5. Grade 1 spondylolisthesis at L5-S1 secondary to bilateral spondylolysis of L5 with mild compression of both L5 nerve roots in the neural foramen.\par 6. Modic type 1 endplate changes adjacent to the L5-S1 disc.\par 7. Modic type 2 endplate changes adjacent to the L1-2, L3-4, and L5-S1 discs.\par \par Impression:\par 1. Postoperative study following multilevel cervical laminectomy and fusion without spinal cord or nerve root\par compression.\par 2. Multilevel cervical degenerative disc disease with reversal of cervical lordosis.\par 3. Small area of myelomalacia in the spinal cord at the level of the C5 superior endplate.\par \par 11/17/21: follow up today. since last visit, he is getting shooting pain across his lower back. Taking Lyrica 100mg PO TID. with some improvement. getting n/t down the left arm. His right leg gives out, externally rotates when he walks. He can not "control" his right leg after it is crossed. -->weakness. given his history I think this warrants a new MRI to evaluate for further cord compression. \par \par 8/10/21- Patient had 80% relief from injection. Has been going to the gym. \par \par 7/12/21- Patient had improvement initial from TFESI. Will schedule repeat.\par \par 5/25/21: follow up today after b/l TFESI L5/S1 on 5/11/21. he reports 2 days following he was pain free. he reports\par pain in the lower back much improved. Getting Lyrica which is helping. still with n/t in the fingers and hands. will\par increase to 100mg bid. \par \par 5/4/21- Patent had spinal surgery in August 2020. Pain improved in neck. Does have numbness in arms. Pain is in back of thighs radiating down both legs. Low back pain is not that bad. Has been on Oxycodone. May need Lyrica. gets burning in the back of the legs. \par \par 1/11/21: F/U TODAY TO REVIEW MRI'S. (1/4/21) Impression:\par 1. Status post laminectomy and posterior fusion from C3-C7. There is a new large fluid collection, probable\par seroma, within the laminectomy surgical site measuring 7.1 x 3.2 x 1.6 cm with multiple thin internal\par septations. No significant surrounding edema to suggest infection. The collection does not directly contact the\par thecal sac. The spinal canal at the surgical levels is significantly decompressed since the previous exam. \par 2. There is mildly progressed disc space narrowing from C3-C7 as compared to the previous exam.\par 3. Multilevel disc bulges which have not significantly changed since the previous exam. No large new\par herniation.\par MRI lumbar spine: Impression:\par 1. Moderate to severe multilevel degenerative disc disease causing varying degrees of the spinal canal and\par neuroforaminal stenosis, detailed above. The bulge at L4-5 is mildly increased in size.\par 2. 1 cm grade 1 anterolisthesis of L5 on S1 with bilateral L5 spondylolysis pars defects, unchanged.\par GURMEET's were helping, however not getting as much prolonged relief. still with relief from last GURMEET.\par \par 12/29/20: follow up today after LESI L5/S1 on 12/15/20. Back pain has improved following >85%. He is having new clonus in the legs and arms. Given his history I believe a MRI of the Cervical and lumbar spine to further evaluate. having continues n/t in the arms that is getting worse. \par \par 10/27/20: Follow up today after LESI L5/S1 on 10/14/20. Back pain about 85% improved. Had tightness across the\par lower back that improved. Pain over the right lower back. MRI of the neck again reviewed. Will need MRI in the near future to evaluate the myelomalacia. Pain in the posterior left calf. \par \par 9/30/20- patient had neck surgery August 18 and is feeling better already. Now complaining of low back pain. Would like a repeat epidural so he can participate in PT. Will get clearance from Dr. Lawson. [Neck] : neck [Lower back] : lower back [8] : 8 [5] : 5 [Dull/Aching] : dull/aching [Radiating] : radiating [Constant] : constant [Household chores] : household chores [Rest] : rest [Injection therapy] : injection therapy [Walking] : walking [Lying in bed] : lying in bed [Retired] : Work status: retired [] : no [FreeTextEntry6] : numbness  [FreeTextEntry7] : left forearm  [de-identified] : getting up from bed  [de-identified] : mri l spine  [TWNoteComboBox1] : 90%

## 2023-06-27 ENCOUNTER — FORM ENCOUNTER (OUTPATIENT)
Age: 58
End: 2023-06-27

## 2023-07-26 ENCOUNTER — FORM ENCOUNTER (OUTPATIENT)
Age: 58
End: 2023-07-26

## 2023-09-07 ENCOUNTER — APPOINTMENT (OUTPATIENT)
Dept: ORTHOPEDIC SURGERY | Facility: CLINIC | Age: 58
End: 2023-09-07
Payer: MEDICARE

## 2023-09-07 PROCEDURE — 72100 X-RAY EXAM L-S SPINE 2/3 VWS: CPT

## 2023-09-07 PROCEDURE — 99213 OFFICE O/P EST LOW 20 MIN: CPT

## 2023-09-07 NOTE — HISTORY OF PRESENT ILLNESS
[Lower back] : lower back [Gradual] : gradual [Dull/Aching] : dull/aching [Sharp] : sharp [Shooting] : shooting [Constant] : constant [Retired] : Work status: retired [10] : 10 [de-identified] : 09/07/2023 Here for a f/u of lower back. Reports aching pian in right side lower back that radiates down RLE. Doing PT with mild relief. Pain does not wake her up at night. Had B/L L5-S1 TFESI with MO Russ on May.   3/20/23: Patient is s/p L5-S1 ALIF 11/11/22. He was doing well since surgery but had a severe flair up after doing yard work. Pain has started to resolved. He does report a constant achiness in the knees but the preop radicular pain resolved. He is currently doing PT and using bone stim. He is taking pain meds prn for pain relief.  [] : no [FreeTextEntry5] : Pt here for follow up of low back pain. Getting progressively better. Balance/walking has gotten much better. Ambulates w/cane.  [FreeTextEntry7] : left leg/ calf  [de-identified] : 11/11/2022 [de-identified] : Posterior spinal instrumentation L5-S1

## 2023-09-07 NOTE — ASSESSMENT
[FreeTextEntry1] : S/p lumbar fusion. Preop symptoms have improved. Aching pain remains in R sided lower back radiating down RLE. Doing PT with mild relief. Will update LS MRI and have her f/up with Dr. Russ to c/w treatment.

## 2023-09-16 ENCOUNTER — APPOINTMENT (OUTPATIENT)
Dept: MRI IMAGING | Facility: CLINIC | Age: 58
End: 2023-09-16
Payer: MEDICARE

## 2023-09-16 PROCEDURE — 72148 MRI LUMBAR SPINE W/O DYE: CPT

## 2023-09-18 ENCOUNTER — NON-APPOINTMENT (OUTPATIENT)
Age: 58
End: 2023-09-18

## 2023-11-09 ENCOUNTER — APPOINTMENT (OUTPATIENT)
Dept: ORTHOPEDIC SURGERY | Facility: CLINIC | Age: 58
End: 2023-11-09
Payer: MEDICARE

## 2023-11-09 PROCEDURE — 99214 OFFICE O/P EST MOD 30 MIN: CPT

## 2023-11-13 ENCOUNTER — APPOINTMENT (OUTPATIENT)
Dept: PAIN MANAGEMENT | Facility: CLINIC | Age: 58
End: 2023-11-13
Payer: MEDICARE

## 2023-11-13 VITALS — WEIGHT: 206 LBS | HEIGHT: 67 IN | BODY MASS INDEX: 32.33 KG/M2

## 2023-11-13 PROCEDURE — 99214 OFFICE O/P EST MOD 30 MIN: CPT

## 2023-11-27 ENCOUNTER — APPOINTMENT (OUTPATIENT)
Dept: PAIN MANAGEMENT | Facility: CLINIC | Age: 58
End: 2023-11-27
Payer: MEDICARE

## 2023-11-27 PROCEDURE — 62323 NJX INTERLAMINAR LMBR/SAC: CPT

## 2024-01-04 ENCOUNTER — APPOINTMENT (OUTPATIENT)
Dept: ORTHOPEDIC SURGERY | Facility: CLINIC | Age: 59
End: 2024-01-04
Payer: MEDICARE

## 2024-01-04 DIAGNOSIS — M51.36 OTHER INTERVERTEBRAL DISC DEGENERATION, LUMBAR REGION: ICD-10-CM

## 2024-01-04 DIAGNOSIS — Z98.1 OTHER INTERVERTEBRAL DISC DEGENERATION, LUMBAR REGION: ICD-10-CM

## 2024-01-04 PROCEDURE — 72100 X-RAY EXAM L-S SPINE 2/3 VWS: CPT

## 2024-01-04 PROCEDURE — 99214 OFFICE O/P EST MOD 30 MIN: CPT

## 2024-01-04 NOTE — HISTORY OF PRESENT ILLNESS
[Lower back] : lower back [Gradual] : gradual [10] : 10 [Dull/Aching] : dull/aching [Sharp] : sharp [Shooting] : shooting [Constant] : constant [Retired] : Work status: retired [] : no [FreeTextEntry5] : Follow Up- L SPINE. Had GURMEET; helped for 2 weeks. Has weakness in legs; shaking when walking. Has had shooting pain down left leg. Discuss sx.  [FreeTextEntry7] : left leg/ calf  [de-identified] : 11/11/2022 [de-identified] : Posterior spinal instrumentation L5-S1

## 2024-01-08 ENCOUNTER — APPOINTMENT (OUTPATIENT)
Dept: CT IMAGING | Facility: CLINIC | Age: 59
End: 2024-01-08
Payer: MEDICARE

## 2024-01-08 PROCEDURE — 72131 CT LUMBAR SPINE W/O DYE: CPT

## 2024-01-09 ENCOUNTER — APPOINTMENT (OUTPATIENT)
Dept: PAIN MANAGEMENT | Facility: CLINIC | Age: 59
End: 2024-01-09

## 2024-01-09 NOTE — HISTORY OF PRESENT ILLNESS
[Neck] : neck [Lower back] : lower back [8] : 8 [5] : 5 [Dull/Aching] : dull/aching [Radiating] : radiating [Constant] : constant [Household chores] : household chores [Rest] : rest [Injection therapy] : injection therapy [Walking] : walking [Lying in bed] : lying in bed [Retired] : Work status: retired [FreeTextEntry1] : 01/09/2024: follow up today for RACHAEL L4/5 on 11/27 11/13/2023: follow up today.  He reports pain in the anterior legs to the calves. Pain in the hips and buttocks. He is slowly getting his full motor function back.  MRI 9/16/23: progression of DDD at L4/5 widening of the facet joints on both sides with reactive bone marrow edema in the left facet joint. New left foraminal disc herniation with compression of the left l4 nerve root. right foraminal disc herniation with compression of the right L5 nerve root.   08/24/2022: follow up today.  Had 100% relief from back pain from RFA.  Pain is returning in both legs.  He had no pain in legs for 1 week.  The last epidural only lasted 1 month.  Would like to consider surgery.  06/22/2022: follow up today after b/l L5/S1 TFESI on 6/8/22. he reports an overall improvement. now he has good days and bad days. Pain worse in the morning.  had lumbar MBB's in 2019 with 100% relief of his pain. ended up with a cervical myelopathy which put the lower back on hold   05/31/2022: follow up today.  Pain is returning in both legs.  Will schedule repeat.    1/10/22: follow up after right L4/5 L5/S1 TFESI on 12/14/21. Had 80% relief so far. Has been having erectile dysfunction and urinary incontinence for last 6 months. Has improved a little since injection. Patient advised to follow up with urology.  11/24/21: follow up today to review MRI's. (11/22/21)Impression: 1. Multilevel lumbar and lower thoracic degenerative disc disease most pronounced at L3-4 and L5-S1. 2. Small right-sided disc herniation at L1-2 without stenosis or nerve root compression. 3. Disc bulges at L2-3 and L3-4 without stenosis or nerve root compression. 4. Disc bulge with a right foraminal disc herniation at L4-5 with mild compression of the right L4 nerve root. 5. Grade 1 spondylolisthesis at L5-S1 secondary to bilateral spondylolysis of L5 with mild compression of both L5 nerve roots in the neural foramen. 6. Modic type 1 endplate changes adjacent to the L5-S1 disc. 7. Modic type 2 endplate changes adjacent to the L1-2, L3-4, and L5-S1 discs.  Impression: 1. Postoperative study following multilevel cervical laminectomy and fusion without spinal cord or nerve root compression. 2. Multilevel cervical degenerative disc disease with reversal of cervical lordosis. 3. Small area of myelomalacia in the spinal cord at the level of the C5 superior endplate.  11/17/21: follow up today. since last visit, he is getting shooting pain across his lower back. Taking Lyrica 100mg PO TID. with some improvement. getting n/t down the left arm. His right leg gives out, externally rotates when he walks. He can not "control" his right leg after it is crossed. -->weakness. given his history I think this warrants a new MRI to evaluate for further cord compression.   8/10/21- Patient had 80% relief from injection. Has been going to the gym.   7/12/21- Patient had improvement initial from TFESI. Will schedule repeat.  5/25/21: follow up today after b/l TFESI L5/S1 on 5/11/21. he reports 2 days following he was pain free. he reports pain in the lower back much improved. Getting Lyrica which is helping. still with n/t in the fingers and hands. will increase to 100mg bid.   5/4/21- Patent had spinal surgery in August 2020. Pain improved in neck. Does have numbness in arms. Pain is in back of thighs radiating down both legs. Low back pain is not that bad. Has been on Oxycodone. May need Lyrica. gets burning in the back of the legs.   1/11/21: F/U TODAY TO REVIEW MRI'S. (1/4/21) Impression: 1. Status post laminectomy and posterior fusion from C3-C7. There is a new large fluid collection, probable seroma, within the laminectomy surgical site measuring 7.1 x 3.2 x 1.6 cm with multiple thin internal septations. No significant surrounding edema to suggest infection. The collection does not directly contact the thecal sac. The spinal canal at the surgical levels is significantly decompressed since the previous exam.  2. There is mildly progressed disc space narrowing from C3-C7 as compared to the previous exam. 3. Multilevel disc bulges which have not significantly changed since the previous exam. No large new herniation. MRI lumbar spine: Impression: 1. Moderate to severe multilevel degenerative disc disease causing varying degrees of the spinal canal and neuroforaminal stenosis, detailed above. The bulge at L4-5 is mildly increased in size. 2. 1 cm grade 1 anterolisthesis of L5 on S1 with bilateral L5 spondylolysis pars defects, unchanged. GURMEET's were helping, however not getting as much prolonged relief. still with relief from last GURMEET.  12/29/20: follow up today after LESI L5/S1 on 12/15/20. Back pain has improved following >85%. He is having new clonus in the legs and arms. Given his history I believe a MRI of the Cervical and lumbar spine to further evaluate. having continues n/t in the arms that is getting worse.   10/27/20: Follow up today after LESI L5/S1 on 10/14/20. Back pain about 85% improved. Had tightness across the lower back that improved. Pain over the right lower back. MRI of the neck again reviewed. Will need MRI in the near future to evaluate the myelomalacia. Pain in the posterior left calf.   9/30/20- patient had neck surgery August 18 and is feeling better already. Now complaining of low back pain. Would like a repeat epidural so he can participate in PT. Will get clearance from Dr. Lawson. [] : no [FreeTextEntry6] : numbness, curshed nerve [FreeTextEntry7] : HIPS, BUTTOCKS [de-identified] : getting up from bed  [de-identified] : mri l spine  [TWNoteComboBox1] : 90%

## 2024-01-09 NOTE — PHYSICAL EXAM
[Extension] : extension [] : no palpable masses [TWNoteComboBox7] : forward flexion 75 degrees [de-identified] : extension 10 degrees

## 2024-01-09 NOTE — ASSESSMENT
[FreeTextEntry1] : After discussing various treatment options with the patient including but not limited to oral medications, physical therapy, exercise, modalities as well as interventional spinal injections, we have decided with the following plan:  1) Intervention Injection Therapy: I personally reviewed the MRI/CT scan images and agree with the radiologist's report. The radiological findings were discussed with the patient. The risks, benefits, contents and alternatives to injection were explained in full to the patient. Risks outlined include but are not limited to infection,sepsis, bleeding, post-dural puncture headache, nerve damage, temporary increase in pain, syncopal episode, failure to resolve symptoms, allergic reaction, symptom recurrence, and elevation of blood sugar in diabetics. Cortisone may cause immunosuppression. Patient understands the risks. All questions were answered. After discussion of options, patient requested an injection. Information regarding the injection was given to the patient. Which medications to stop prior to the injection was explained to the patient as well.  Follow up in 1-2 weeks post injection for re-evaluation.  Continue Home exercises, stretching, activity modification, physical therapy, and conservative care. Patient is presenting with acute/sub-acute radicular pain with impairment in ADLs and functionality.  The pain has not responded sufficiently to  conservative care including nsaid therapy and/or physical therapy.  There is no bleeding tendency, unstable medical condition, or systemic infection. The purpose of the spinal injections is to facilitate active therapy by providing short term relief through reduction of pain and inflammation.   Injections, by themselves, are not likely to provide long-term relief. Rather, active rehabilitation with modified work achieves long-term relief by increasing active ROM, strength and stability.   LESI L4/5

## 2024-01-29 ENCOUNTER — APPOINTMENT (OUTPATIENT)
Dept: ORTHOPEDIC SURGERY | Facility: CLINIC | Age: 59
End: 2024-01-29
Payer: MEDICARE

## 2024-01-29 PROCEDURE — 99214 OFFICE O/P EST MOD 30 MIN: CPT

## 2024-01-29 NOTE — HISTORY OF PRESENT ILLNESS
[Lower back] : lower back [Gradual] : gradual [10] : 10 [Dull/Aching] : dull/aching [Sharp] : sharp [Shooting] : shooting [Constant] : constant [Retired] : Work status: retired [de-identified] : 01/29/24: f/up L spine. Since last visit pain has improved. CT scan review.   01/09/24:  Follow Up- L SPINE. Had GURMEET; helped for 2 weeks. Has weakness in legs; shaking when walking. Has had shooting pain down left leg. Discuss sx.    11/09/2023 Here for a f/u of lower back. Reports pain in b/l LE L>R. Difficulty walking. Pain radiates down anterior thighs. Frequent spasms. Ambulating with a cane. Symptoms worsen 3 days ago.   09/07/2023 Here for a f/u of lower back. Reports aching pian in right side lower back that radiates down RLE. Doing PT with mild relief. Pain does not wake her up at night. Had B/L L5-S1 TFESI with MO Russ on May.   3/20/23: Patient is s/p L5-S1 ALIF 11/11/22. He was doing well since surgery but had a severe flair up after doing yard work. Pain has started to resolved. He does report a constant achiness in the knees but the preop radicular pain resolved. He is currently doing PT and using bone stim. He is taking pain meds prn for pain relief.  [] : no [FreeTextEntry5] : CT REVIEW L SPINE [FreeTextEntry7] : left leg/ calf  [de-identified] : 11/11/2022 [de-identified] : Posterior spinal instrumentation L5-S1

## 2024-01-29 NOTE — DATA REVIEWED
[CT Scan] : CT scan [Lumbar Spine] : lumbar spine [Report was reviewed and noted in the chart] : The report was reviewed and noted in the chart [I independently reviewed and interpreted images and report] : I independently reviewed and interpreted images and report [FreeTextEntry1] : [facility] L Spine CT 01/08/24 1. Postop L5-S1 discectomy and lumbosacral fusion w/o hardware failure or pseudoarthrosis,  2. L3-4 left foraminal HNP and endplate osteophytes with mild compression of left L3.   [facility] L-spine MRI 09/16/23 1. Preop L5-S1 discectomy and fusion w/o nerve compression.  2. L4-5 adjacent segment disease, reactive bone marrow edema in articulation process of left facet joint.

## 2024-01-29 NOTE — ASSESSMENT
[FreeTextEntry1] : S/p [id number] and fusion. Had episodes of b/l leg weakness and intermittent RLE radic into anterior and posterior thigh. CT scan:  Postop L5-S1 discectomy and lumbosacral fusion w/o hardware failure or pseudoarthrosis; L3-4 left foraminal HNP and endplate osteophytes with mild compression of left L3.   - Symptoms have improved to this day.  - Assured Praveen there's no hardware failure or pseudoarthrosis and healing process is progressing satisfactorily thus far. the  34 level is responsible for the ongoing symptoms;  5-1 has healed a great deal and is almost solid;  so the ongoing issues are not a consequence of incomplete healing a 5-1 - F/up in 3 months to eval intermittent radic in RLE.

## 2024-03-27 NOTE — DATA REVIEWED
[MRI] : MRI [Lumbar Spine] : lumbar spine [I independently reviewed and interpreted images and report] : I independently reviewed and interpreted images and report [Report was reviewed and noted in the chart] : The report was reviewed and noted in the chart [I reviewed the films/CD and agree] : I reviewed the films/CD and agree

## 2024-03-28 ENCOUNTER — APPOINTMENT (OUTPATIENT)
Dept: PAIN MANAGEMENT | Facility: CLINIC | Age: 59
End: 2024-03-28
Payer: MEDICARE

## 2024-03-28 VITALS — WEIGHT: 195 LBS | HEIGHT: 67 IN | BODY MASS INDEX: 30.61 KG/M2

## 2024-03-28 PROCEDURE — 99214 OFFICE O/P EST MOD 30 MIN: CPT

## 2024-03-28 NOTE — HISTORY OF PRESENT ILLNESS
[Neck] : neck [Lower back] : lower back [8] : 8 [Radiating] : radiating [Dull/Aching] : dull/aching [Constant] : constant [Rest] : rest [Household chores] : household chores [Walking] : walking [Injection therapy] : injection therapy [Lying in bed] : lying in bed [Retired] : Work status: retired [de-identified] : getting up from bed  [5] : 5 [Shooting] : shooting [FreeTextEntry1] : 3/28/24- fu for LESI L4/5 on 11/27 with 50% relief.  Pain over the lower back and pain in the bilateral legs. +n/t in the left foot. +n/t in the hands.   11/13/2023: follow up today.  He reports pain in the anterior legs to the calves. Pain in the hips and buttocks. He is slowly getting his full motor function back.  MRI 9/16/23: progression of DDD at L4/5 widening of the facet joints on both sides with reactive bone marrow edema in the left facet joint. New left foraminal disc herniation with compression of the left l4 nerve root. right foraminal disc herniation with compression of the right L5 nerve root.   08/24/2022: follow up today.  Had 100% relief from back pain from RFA.  Pain is returning in both legs.  He had no pain in legs for 1 week.  The last epidural only lasted 1 month.  Would like to consider surgery.  06/22/2022: follow up today after b/l L5/S1 TFESI on 6/8/22. he reports an overall improvement. now he has good days and bad days. Pain worse in the morning.  had lumbar MBB's in 2019 with 100% relief of his pain. ended up with a cervical myelopathy which put the lower back on hold   05/31/2022: follow up today.  Pain is returning in both legs.  Will schedule repeat.    1/10/22: follow up after right L4/5 L5/S1 TFESI on 12/14/21. Had 80% relief so far. Has been having erectile dysfunction and urinary incontinence for last 6 months. Has improved a little since injection. Patient advised to follow up with urology.  11/24/21: follow up today to review MRI's. (11/22/21)Impression: 1. Multilevel lumbar and lower thoracic degenerative disc disease most pronounced at L3-4 and L5-S1. 2. Small right-sided disc herniation at L1-2 without stenosis or nerve root compression. 3. Disc bulges at L2-3 and L3-4 without stenosis or nerve root compression. 4. Disc bulge with a right foraminal disc herniation at L4-5 with mild compression of the right L4 nerve root. 5. Grade 1 spondylolisthesis at L5-S1 secondary to bilateral spondylolysis of L5 with mild compression of both L5 nerve roots in the neural foramen. 6. Modic type 1 endplate changes adjacent to the L5-S1 disc. 7. Modic type 2 endplate changes adjacent to the L1-2, L3-4, and L5-S1 discs.  Impression: 1. Postoperative study following multilevel cervical laminectomy and fusion without spinal cord or nerve root compression. 2. Multilevel cervical degenerative disc disease with reversal of cervical lordosis. 3. Small area of myelomalacia in the spinal cord at the level of the C5 superior endplate.  11/17/21: follow up today. since last visit, he is getting shooting pain across his lower back. Taking Lyrica 100mg PO TID. with some improvement. getting n/t down the left arm. His right leg gives out, externally rotates when he walks. He can not "control" his right leg after it is crossed. -->weakness. given his history I think this warrants a new MRI to evaluate for further cord compression.   8/10/21- Patient had 80% relief from injection. Has been going to the gym.   7/12/21- Patient had improvement initial from TFESI. Will schedule repeat.  5/25/21: follow up today after b/l TFESI L5/S1 on 5/11/21. he reports 2 days following he was pain free. he reports pain in the lower back much improved. Getting Lyrica which is helping. still with n/t in the fingers and hands. will increase to 100mg bid.   5/4/21- Patent had spinal surgery in August 2020. Pain improved in neck. Does have numbness in arms. Pain is in back of thighs radiating down both legs. Low back pain is not that bad. Has been on Oxycodone. May need Lyrica. gets burning in the back of the legs.   1/11/21: F/U TODAY TO REVIEW MRI'S. (1/4/21) Impression: 1. Status post laminectomy and posterior fusion from C3-C7. There is a new large fluid collection, probable seroma, within the laminectomy surgical site measuring 7.1 x 3.2 x 1.6 cm with multiple thin internal septations. No significant surrounding edema to suggest infection. The collection does not directly contact the thecal sac. The spinal canal at the surgical levels is significantly decompressed since the previous exam.  2. There is mildly progressed disc space narrowing from C3-C7 as compared to the previous exam. 3. Multilevel disc bulges which have not significantly changed since the previous exam. No large new herniation. MRI lumbar spine: Impression: 1. Moderate to severe multilevel degenerative disc disease causing varying degrees of the spinal canal and neuroforaminal stenosis, detailed above. The bulge at L4-5 is mildly increased in size. 2. 1 cm grade 1 anterolisthesis of L5 on S1 with bilateral L5 spondylolysis pars defects, unchanged. GURMEET's were helping, however not getting as much prolonged relief. still with relief from last GURMEET.  12/29/20: follow up today after LESI L5/S1 on 12/15/20. Back pain has improved following >85%. He is having new clonus in the legs and arms. Given his history I believe a MRI of the Cervical and lumbar spine to further evaluate. having continues n/t in the arms that is getting worse.   10/27/20: Follow up today after LESI L5/S1 on 10/14/20. Back pain about 85% improved. Had tightness across the lower back that improved. Pain over the right lower back. MRI of the neck again reviewed. Will need MRI in the near future to evaluate the myelomalacia. Pain in the posterior left calf.   9/30/20- patient had neck surgery August 18 and is feeling better already. Now complaining of low back pain. Would like a repeat epidural so he can participate in PT. Will get clearance from Dr. Lawson. [] : no [FreeTextEntry7] : across the javier [FreeTextEntry6] : numbness, curshed nerve [de-identified] : mri l spine  [TWNoteComboBox1] : 90%

## 2024-03-28 NOTE — PHYSICAL EXAM
[Extension] : extension [4___] : right extensor hallicus longus 4[unfilled]/5 [] : no palpable masses [TWNoteComboBox7] : forward flexion 75 degrees [de-identified] : extension 10 degrees

## 2024-03-28 NOTE — ASSESSMENT
[FreeTextEntry1] : After discussing various treatment options with the patient including but not limited to oral medications, physical therapy, exercise, modalities as well as interventional spinal injections, we have decided with the following plan:  1) Intervention Injection Therapy: I personally reviewed the MRI/CT scan images and agree with the radiologist's report. The radiological findings were discussed with the patient. The risks, benefits, contents and alternatives to injection were explained in full to the patient. Risks outlined include but are not limited to infection,sepsis, bleeding, post-dural puncture headache, nerve damage, temporary increase in pain, syncopal episode, failure to resolve symptoms, allergic reaction, symptom recurrence, and elevation of blood sugar in diabetics. Cortisone may cause immunosuppression. Patient understands the risks. All questions were answered. After discussion of options, patient requested an injection. Information regarding the injection was given to the patient. Which medications to stop prior to the injection was explained to the patient as well.  Follow up in 1-2 weeks post injection for re-evaluation.  Continue Home exercises, stretching, activity modification, physical therapy, and conservative care. Patient is presenting with acute/sub-acute radicular pain with impairment in ADLs and functionality.  The pain has not responded sufficiently to  conservative care including nsaid therapy and/or physical therapy.  There is no bleeding tendency, unstable medical condition, or systemic infection. The purpose of the spinal injections is to facilitate active therapy by providing short term relief through reduction of pain and inflammation.   Injections, by themselves, are not likely to provide long-term relief. Rather, active rehabilitation with modified work achieves long-term relief by increasing active ROM, strength and stability.   Caudal GURMEET

## 2024-04-01 ENCOUNTER — APPOINTMENT (OUTPATIENT)
Dept: ORTHOPEDIC SURGERY | Facility: CLINIC | Age: 59
End: 2024-04-01
Payer: MEDICARE

## 2024-04-01 PROCEDURE — 99212 OFFICE O/P EST SF 10 MIN: CPT

## 2024-04-01 NOTE — ASSESSMENT
[FreeTextEntry1] : S/p [id number] and fusion. Had episodes of b/l leg weakness and intermittent RLE radic into anterior and posterior thigh. CT scan:  Postop L5-S1 discectomy and lumbosacral fusion w/o hardware failure or pseudoarthrosis; L3-4 left foraminal HNP and endplate osteophytes with mild compression of left L3.   - Assured Praveen there's no hardware failure or pseudoarthrosis and healing process is progressing satisfactorily thus far. the  34 level is responsible for the ongoing symptoms; 5-1 has healed a great deal and is almost solid; so the ongoing issues are not a consequence of incomplete healing a 5-1 - Scheduled for caudal GURMEET w/Petrona. F/up after he returns from trip to [place]

## 2024-04-01 NOTE — HISTORY OF PRESENT ILLNESS
[Lower back] : lower back [Gradual] : gradual [10] : 10 [Dull/Aching] : dull/aching [Sharp] : sharp [Shooting] : shooting [Constant] : constant [Retired] : Work status: retired [de-identified] : 4/1/24: Follow Up L SPINE. No changes since last visit. Continues with achy pain in back of thighs. Scheduled for caudal GURMEET jami/ Petrona.   01/29/24: f/up L spine. Since last visit pain has improved. CT scan review.   01/09/24:  Follow Up- L SPINE. Had GURMEET; helped for 2 weeks. Has weakness in legs; shaking when walking. Has had shooting pain down left leg. Discuss sx.    11/09/2023 Here for a f/u of lower back. Reports pain in b/l LE L>R. Difficulty walking. Pain radiates down anterior thighs. Frequent spasms. Ambulating with a cane. Symptoms worsen 3 days ago.   09/07/2023 Here for a f/u of lower back. Reports aching pian in right side lower back that radiates down RLE. Doing PT with mild relief. Pain does not wake her up at night. Had B/L L5-S1 TFESI with MO Russ on May.   3/20/23: Patient is s/p L5-S1 ALIF 11/11/22. He was doing well since surgery but had a severe flair up after doing yard work. Pain has started to resolved. He does report a constant achiness in the knees but the preop radicular pain resolved. He is currently doing PT and using bone stim. He is taking pain meds prn for pain relief.  [] : no [de-identified] : 11/11/2022 [de-identified] : Posterior spinal instrumentation L5-S1 [FreeTextEntry7] : left leg/ calf

## 2024-04-01 NOTE — PHYSICAL EXAM
[] : motor exam is 5/5 throughout both lower extremities with normal tone [Implants in position] : Implants in position [Fusion intact] : Fusion intact [No loosening of hardware] : No loosening of hardware

## 2024-04-08 ENCOUNTER — APPOINTMENT (OUTPATIENT)
Dept: PAIN MANAGEMENT | Facility: CLINIC | Age: 59
End: 2024-04-08
Payer: MEDICARE

## 2024-04-08 PROCEDURE — 62323 NJX INTERLAMINAR LMBR/SAC: CPT

## 2024-04-08 NOTE — PROCEDURE
[FreeTextEntry3] : Date of Service: 04/08/2024   Account: [id number]   Patient: FARIDA BOYD   YOB: 1965   Age: 58 year     Surgeon:      Debbie Russ M.D.   Pre-Operative Diagnosis:                              Lumbar Radiculitis (54.17)   Post Operative Diagnosis:                            Same   Procedure:                                                       Caudal epidural steroid injection  under fluoroscopic guidance   Anesthesia:                                                      None     This procedure was carried out using fluoroscopic guidance.  The risks and benefits of the procedure were discussed extensively with the patient.  Risks included infection, bleeding, epidural hematoma, nerve damage, and post-dural puncture headache.  The consent of the patient was obtained and the following procedure was performed.   The patient was placed in the prone position using a pillow under the abdomen to reduce the lumbar lordosis.  The lumbo-sacral area was prepped and draped in a sterile fashion.  Using fluoroscopic guidance the sacrum was visualized using a lateral view.   Using sterile technique the superficial skin was anesthetized with 1.5% Lidocaine without epinephrine.  A 22 inch spinal needle was advanced under fluoroscopy using cayuu-utkzwqbei-uczdf technique until the sacral-coccygeal ligament was engaged and penetrated.  After confirmation of needle placement in the epidural space, the needle was advanced to approximately the S3 level.    After negative aspiration for heme and CSF, 3 cc of Omnipaque confirmed good lumbar epiduragram.  An AP view was used to confirm dye spread.  An injectate of 10 cc of preservative free normal saline, 0.25% marcaine and 1% Lidocaine plus 80 mg of Kenalog was then injected into the epidural space. The needle was subsequently removed and pressure was applied.   The patient tolerated the procedure well without any complications.  Vitals signs remained within normal limits throughout the procedure.  The patient was instructed to apply ice to the area for approximately 20 minutes 3-4  times for the next 24 hours.  The patient was also instructed to contact me immediately if there were any problems.   Debbie Russ M.D.

## 2024-04-23 RX ORDER — PREGABALIN 100 MG/1
100 CAPSULE ORAL
Qty: 90 | Refills: 0 | Status: ACTIVE | COMMUNITY
Start: 2022-05-31 | End: 1900-01-01

## 2024-05-09 ENCOUNTER — APPOINTMENT (OUTPATIENT)
Dept: PAIN MANAGEMENT | Facility: CLINIC | Age: 59
End: 2024-05-09
Payer: MEDICARE

## 2024-05-09 VITALS — WEIGHT: 196 LBS | BODY MASS INDEX: 30.76 KG/M2 | HEIGHT: 67 IN

## 2024-05-09 PROCEDURE — 99213 OFFICE O/P EST LOW 20 MIN: CPT

## 2024-05-09 RX ORDER — NORTRIPTYLINE HYDROCHLORIDE 25 MG/1
25 CAPSULE ORAL
Qty: 60 | Refills: 0 | Status: ACTIVE | COMMUNITY
Start: 2024-05-09 | End: 1900-01-01

## 2024-05-09 RX ORDER — TIZANIDINE 4 MG/1
4 TABLET ORAL 3 TIMES DAILY
Qty: 90 | Refills: 2 | Status: ACTIVE | COMMUNITY
Start: 2024-05-09 | End: 1900-01-01

## 2024-05-09 NOTE — PHYSICAL EXAM
[Extension] : extension [4___] : right extensor hallicus longus 4[unfilled]/5 [] : no palpable masses [TWNoteComboBox7] : forward flexion 75 degrees [de-identified] : extension 10 degrees

## 2024-05-09 NOTE — HISTORY OF PRESENT ILLNESS
[Neck] : neck [Lower back] : lower back [Dull/Aching] : dull/aching [Radiating] : radiating [Rest] : rest [Injection therapy] : injection therapy [Retired] : Work status: retired [6] : 6 [3] : 3 [Shooting] : shooting [Constant] : constant [Sleep] : sleep [Sitting] : sitting [Standing] : standing [FreeTextEntry1] : 05/09/2024: follow up today for caudal on 4/8 with 90% relief at the beginning and now 65% relief.    3/28/24- fu for LESI L4/5 on 11/27 with 50% relief.  Pain over the lower back and pain in the bilateral legs. +n/t in the left foot. +n/t in the hands.   11/13/2023: follow up today.  He reports pain in the anterior legs to the calves. Pain in the hips and buttocks. He is slowly getting his full motor function back.  MRI 9/16/23: progression of DDD at L4/5 widening of the facet joints on both sides with reactive bone marrow edema in the left facet joint. New left foraminal disc herniation with compression of the left l4 nerve root. right foraminal disc herniation with compression of the right L5 nerve root.   08/24/2022: follow up today.  Had 100% relief from back pain from RFA.  Pain is returning in both legs.  He had no pain in legs for 1 week.  The last epidural only lasted 1 month.  Would like to consider surgery.  06/22/2022: follow up today after b/l L5/S1 TFESI on 6/8/22. he reports an overall improvement. now he has good days and bad days. Pain worse in the morning.  had lumbar MBB's in 2019 with 100% relief of his pain. ended up with a cervical myelopathy which put the lower back on hold   05/31/2022: follow up today.  Pain is returning in both legs.  Will schedule repeat.    1/10/22: follow up after right L4/5 L5/S1 TFESI on 12/14/21. Had 80% relief so far. Has been having erectile dysfunction and urinary incontinence for last 6 months. Has improved a little since injection. Patient advised to follow up with urology.  11/24/21: follow up today to review MRI's. (11/22/21)Impression: 1. Multilevel lumbar and lower thoracic degenerative disc disease most pronounced at L3-4 and L5-S1. 2. Small right-sided disc herniation at L1-2 without stenosis or nerve root compression. 3. Disc bulges at L2-3 and L3-4 without stenosis or nerve root compression. 4. Disc bulge with a right foraminal disc herniation at L4-5 with mild compression of the right L4 nerve root. 5. Grade 1 spondylolisthesis at L5-S1 secondary to bilateral spondylolysis of L5 with mild compression of both L5 nerve roots in the neural foramen. 6. Modic type 1 endplate changes adjacent to the L5-S1 disc. 7. Modic type 2 endplate changes adjacent to the L1-2, L3-4, and L5-S1 discs.  Impression: 1. Postoperative study following multilevel cervical laminectomy and fusion without spinal cord or nerve root compression. 2. Multilevel cervical degenerative disc disease with reversal of cervical lordosis. 3. Small area of myelomalacia in the spinal cord at the level of the C5 superior endplate.  11/17/21: follow up today. since last visit, he is getting shooting pain across his lower back. Taking Lyrica 100mg PO TID. with some improvement. getting n/t down the left arm. His right leg gives out, externally rotates when he walks. He can not "control" his right leg after it is crossed. -->weakness. given his history I think this warrants a new MRI to evaluate for further cord compression.   8/10/21- Patient had 80% relief from injection. Has been going to the gym.   7/12/21- Patient had improvement initial from TFESI. Will schedule repeat.  5/25/21: follow up today after b/l TFESI L5/S1 on 5/11/21. he reports 2 days following he was pain free. he reports pain in the lower back much improved. Getting Lyrica which is helping. still with n/t in the fingers and hands. will increase to 100mg bid.   5/4/21- Patent had spinal surgery in August 2020. Pain improved in neck. Does have numbness in arms. Pain is in back of thighs radiating down both legs. Low back pain is not that bad. Has been on Oxycodone. May need Lyrica. gets burning in the back of the legs.   1/11/21: F/U TODAY TO REVIEW MRI'S. (1/4/21) Impression: 1. Status post laminectomy and posterior fusion from C3-C7. There is a new large fluid collection, probable seroma, within the laminectomy surgical site measuring 7.1 x 3.2 x 1.6 cm with multiple thin internal septations. No significant surrounding edema to suggest infection. The collection does not directly contact the thecal sac. The spinal canal at the surgical levels is significantly decompressed since the previous exam.  2. There is mildly progressed disc space narrowing from C3-C7 as compared to the previous exam. 3. Multilevel disc bulges which have not significantly changed since the previous exam. No large new herniation. MRI lumbar spine: Impression: 1. Moderate to severe multilevel degenerative disc disease causing varying degrees of the spinal canal and neuroforaminal stenosis, detailed above. The bulge at L4-5 is mildly increased in size. 2. 1 cm grade 1 anterolisthesis of L5 on S1 with bilateral L5 spondylolysis pars defects, unchanged. GURMEET's were helping, however not getting as much prolonged relief. still with relief from last GURMEET.  12/29/20: follow up today after LESI L5/S1 on 12/15/20. Back pain has improved following >85%. He is having new clonus in the legs and arms. Given his history I believe a MRI of the Cervical and lumbar spine to further evaluate. having continues n/t in the arms that is getting worse.   10/27/20: Follow up today after LESI L5/S1 on 10/14/20. Back pain about 85% improved. Had tightness across the lower back that improved. Pain over the right lower back. MRI of the neck again reviewed. Will need MRI in the near future to evaluate the myelomalacia. Pain in the posterior left calf.   9/30/20- patient had neck surgery August 18 and is feeling better already. Now complaining of low back pain. Would like a repeat epidural so he can participate in PT. Will get clearance from Dr. Lawson. [] : no [FreeTextEntry7] : across the javier [de-identified] : mri l spine

## 2024-05-09 NOTE — ASSESSMENT
[FreeTextEntry1] : After discussing various treatment options with the patient including but not limited to oral medications, physical therapy, exercise, modalities as well as interventional spinal injections, we have decided with the following plan:  1) Intervention Injection Therapy: I personally reviewed the MRI/CT scan images and agree with the radiologist's report. The radiological findings were discussed with the patient. The risks, benefits, contents and alternatives to injection were explained in full to the patient. Risks outlined include but are not limited to infection,sepsis, bleeding, post-dural puncture headache, nerve damage, temporary increase in pain, syncopal episode, failure to resolve symptoms, allergic reaction, symptom recurrence, and elevation of blood sugar in diabetics. Cortisone may cause immunosuppression. Patient understands the risks. All questions were answered. After discussion of options, patient requested an injection. Information regarding the injection was given to the patient. Which medications to stop prior to the injection was explained to the patient as well.  Follow up in 1-2 weeks post injection for re-evaluation.  Continue Home exercises, stretching, activity modification, physical therapy, and conservative care. Patient is presenting with acute/sub-acute radicular pain with impairment in ADLs and functionality.  The pain has not responded sufficiently to  conservative care including nsaid therapy and/or physical therapy.  There is no bleeding tendency, unstable medical condition, or systemic infection. The purpose of the spinal injections is to facilitate active therapy by providing short term relief through reduction of pain and inflammation.   Injections, by themselves, are not likely to provide long-term relief. Rather, active rehabilitation with modified work achieves long-term relief by increasing active ROM, strength and stability.   Caudal GURMEET - will caudal will add Pamelor

## 2024-05-13 ENCOUNTER — APPOINTMENT (OUTPATIENT)
Dept: ORTHOPEDIC SURGERY | Facility: CLINIC | Age: 59
End: 2024-05-13
Payer: MEDICARE

## 2024-05-13 DIAGNOSIS — Z98.1 ARTHRODESIS STATUS: ICD-10-CM

## 2024-05-13 DIAGNOSIS — M48.00 SPINAL STENOSIS, SITE UNSPECIFIED: ICD-10-CM

## 2024-05-13 DIAGNOSIS — M47.816 SPONDYLOSIS W/OUT MYELOPATHY OR RADICULOPATHY, LUMBAR REGION: ICD-10-CM

## 2024-05-13 PROCEDURE — 99213 OFFICE O/P EST LOW 20 MIN: CPT

## 2024-05-13 NOTE — HISTORY OF PRESENT ILLNESS
[Lower back] : lower back [Gradual] : gradual [10] : 10 [Dull/Aching] : dull/aching [Sharp] : sharp [Shooting] : shooting [Constant] : constant [Retired] : Work status: retired [de-identified] : 5/13/24: f/u LBP. states he has been doing very well since Caudal GURMEET with Dr Russ 4/8/24. She also switched medications to nortriptyline and tizanidine that have been helpful. oxycodone rarely. discussed repeat inj for july prn.   4/1/24: Follow Up L SPINE. No changes since last visit. Continues with achy pain in back of thighs. Scheduled for caudal GURMEET w/ [place].   01/29/24: f/up L spine. Since last visit pain has improved. CT scan review.   01/09/24:  Follow Up- L SPINE. Had GURMEET; helped for 2 weeks. Has weakness in legs; shaking when walking. Has had shooting pain down left leg. Discuss sx.    11/09/2023 Here for a f/u of lower back. Reports pain in b/l LE L>R. Difficulty walking. Pain radiates down anterior thighs. Frequent spasms. Ambulating with a cane. Symptoms worsen 3 days ago.   09/07/2023 Here for a f/u of lower back. Reports aching pian in right side lower back that radiates down RLE. Doing PT with mild relief. Pain does not wake her up at night. Had B/L L5-S1 TFESI with MO Russ on May.   3/20/23: Patient is s/p L5-S1 ALIF 11/11/22. He was doing well since surgery but had a severe flair up after doing yard work. Pain has started to resolved. He does report a constant achiness in the knees but the preop radicular pain resolved. He is currently doing PT and using bone stim. He is taking pain meds prn for pain relief.  [] : no [FreeTextEntry5] : Follow Up L SPINE. Doing much better since last visit. Had GURMEET 05/2024 which helped.  [FreeTextEntry7] : left leg/ calf  [de-identified] : 11/11/2022 [de-identified] : Posterior spinal instrumentation L5-S1

## 2024-05-13 NOTE — ASSESSMENT
[FreeTextEntry1] : S/p L5-Q1raomysxphp and fusion. Had episodes of b/l leg weakness and intermittent RLE radic into anterior and posterior thigh. CT scan:  Postop L5-S1 discectomy and lumbosacral fusion w/o hardware failure or pseudoarthrosis; L3-4 left foraminal HNP and endplate osteophytes with mild compression of left L3.   - Assured Praveen there's no hardware failure or pseudoarthrosis and healing process is progressing satisfactorily thus far. the  34 level is responsible for the ongoing symptoms; 5-1 has healed a great deal and is almost solid; so the ongoing issues are not a consequence of incomplete healing a 5-1 - Has responded well to caudal GURMEET 4/8/24 and change of medications (tizanidine & nortriptyline instead of pregabalin with Dr Russ). Taking oxycodone sparingly. Discussed role of repeat ESIs prn. Hold off for now as he's doing well. f/u in 2-3 months or sooner if neeed.  Patient seen by Bessy Montiel PA-C under the supervision of Monico Miguel MD

## 2024-07-08 ENCOUNTER — APPOINTMENT (OUTPATIENT)
Dept: PAIN MANAGEMENT | Facility: CLINIC | Age: 59
End: 2024-07-08
Payer: MEDICARE

## 2024-07-08 PROCEDURE — 62323 NJX INTERLAMINAR LMBR/SAC: CPT

## 2024-07-15 ENCOUNTER — APPOINTMENT (OUTPATIENT)
Dept: ORTHOPEDIC SURGERY | Facility: CLINIC | Age: 59
End: 2024-07-15
Payer: MEDICARE

## 2024-07-15 DIAGNOSIS — M51.36 OTHER INTERVERTEBRAL DISC DEGENERATION, LUMBAR REGION: ICD-10-CM

## 2024-07-15 DIAGNOSIS — Z98.1 OTHER INTERVERTEBRAL DISC DEGENERATION, LUMBAR REGION: ICD-10-CM

## 2024-07-15 DIAGNOSIS — Z98.1 ARTHRODESIS STATUS: ICD-10-CM

## 2024-07-15 PROCEDURE — 72100 X-RAY EXAM L-S SPINE 2/3 VWS: CPT

## 2024-07-15 PROCEDURE — 99213 OFFICE O/P EST LOW 20 MIN: CPT

## 2024-07-23 ENCOUNTER — APPOINTMENT (OUTPATIENT)
Dept: PAIN MANAGEMENT | Facility: CLINIC | Age: 59
End: 2024-07-23
Payer: MEDICARE

## 2024-07-23 VITALS — BODY MASS INDEX: 31.39 KG/M2 | WEIGHT: 200 LBS | HEIGHT: 67 IN

## 2024-07-23 DIAGNOSIS — M25.562 PAIN IN RIGHT KNEE: ICD-10-CM

## 2024-07-23 DIAGNOSIS — M25.561 PAIN IN RIGHT KNEE: ICD-10-CM

## 2024-07-23 DIAGNOSIS — G89.29 PAIN IN RIGHT KNEE: ICD-10-CM

## 2024-07-23 DIAGNOSIS — M48.00 SPINAL STENOSIS, SITE UNSPECIFIED: ICD-10-CM

## 2024-07-23 PROCEDURE — 20610 DRAIN/INJ JOINT/BURSA W/O US: CPT | Mod: 50

## 2024-07-23 PROCEDURE — J3490M: CUSTOM | Mod: JZ

## 2024-07-23 PROCEDURE — 73560 X-RAY EXAM OF KNEE 1 OR 2: CPT | Mod: 50

## 2024-07-23 PROCEDURE — 99214 OFFICE O/P EST MOD 30 MIN: CPT | Mod: 25

## 2024-07-23 NOTE — ASSESSMENT
[FreeTextEntry1] : After discussing various treatment options with the patient including but not limited to oral medications, physical therapy, exercise, modalities as well as interventional spinal injections, we have decided with the following plan:  1) Intervention Injection Therapy: I personally reviewed the MRI/CT scan images and agree with the radiologist's report. The radiological findings were discussed with the patient. The risks, benefits, contents and alternatives to injection were explained in full to the patient. Risks outlined include but are not limited to infection,sepsis, bleeding, post-dural puncture headache, nerve damage, temporary increase in pain, syncopal episode, failure to resolve symptoms, allergic reaction, symptom recurrence, and elevation of blood sugar in diabetics. Cortisone may cause immunosuppression. Patient understands the risks. All questions were answered. After discussion of options, patient requested an injection. Information regarding the injection was given to the patient. Which medications to stop prior to the injection was explained to the patient as well.  Follow up in 1-2 weeks post injection for re-evaluation.  Continue Home exercises, stretching, activity modification, physical therapy, and conservative care. Patient is presenting with acute/sub-acute radicular pain with impairment in ADLs and functionality.  The pain has not responded sufficiently to  conservative care including nsaid therapy and/or physical therapy.  There is no bleeding tendency, unstable medical condition, or systemic infection. The purpose of the spinal injections is to facilitate active therapy by providing short term relief through reduction of pain and inflammation.   Injections, by themselves, are not likely to provide long-term relief. Rather, active rehabilitation with modified work achieves long-term relief by increasing active ROM, strength and stability.   Caudal GURMEET - will call

## 2024-07-23 NOTE — PHYSICAL EXAM
[Extension] : extension [4___] : right extensor hallicus longus 4[unfilled]/5 [] : anterior tenderness [5___] : hamstring 5[unfilled]/5 [Bilateral] : knee bilaterally [advanced tricompartmental OA with medial compartment narrowing and varus alignment] : advanced tricompartmental OA with medial compartment narrowing and varus alignment [TWNoteComboBox7] : flexion 120 degrees [de-identified] : extension 0 degrees

## 2024-07-23 NOTE — HISTORY OF PRESENT ILLNESS
[Neck] : neck [Lower back] : lower back [Radiating] : radiating [Rest] : rest [Injection therapy] : injection therapy [Retired] : Work status: retired [5] : 5 [Intermittent] : intermittent [Sleep] : sleep [FreeTextEntry1] : 07/23/2024: follow up today for caudal on 7/8 with 80% relief. Pain in the b/l knees, worse with prolonged walking. Pain around the knee caps. Xrays show tricompartmental arthritis. He has braces at home.  He stopped the Pamelor due to side effects. (felt weird)   BILATERAL CSI TO THE KNEES TODAY  05/09/2024: follow up today for caudal on 4/8 with 90% relief at the beginning and now 65% relief.    3/28/24- fu for LESI L4/5 on 11/27 with 50% relief.  Pain over the lower back and pain in the bilateral legs. +n/t in the left foot. +n/t in the hands.   11/13/2023: follow up today.  He reports pain in the anterior legs to the calves. Pain in the hips and buttocks. He is slowly getting his full motor function back.  MRI 9/16/23: progression of DDD at L4/5 widening of the facet joints on both sides with reactive bone marrow edema in the left facet joint. New left foraminal disc herniation with compression of the left l4 nerve root. right foraminal disc herniation with compression of the right L5 nerve root.   08/24/2022: follow up today.  Had 100% relief from back pain from RFA.  Pain is returning in both legs.  He had no pain in legs for 1 week.  The last epidural only lasted 1 month.  Would like to consider surgery.  06/22/2022: follow up today after b/l L5/S1 TFESI on 6/8/22. he reports an overall improvement. now he has good days and bad days. Pain worse in the morning.  had lumbar MBB's in 2019 with 100% relief of his pain. ended up with a cervical myelopathy which put the lower back on hold   05/31/2022: follow up today.  Pain is returning in both legs.  Will schedule repeat.    1/10/22: follow up after right L4/5 L5/S1 TFESI on 12/14/21. Had 80% relief so far. Has been having erectile dysfunction and urinary incontinence for last 6 months. Has improved a little since injection. Patient advised to follow up with urology.  11/24/21: follow up today to review MRI's. (11/22/21)Impression: 1. Multilevel lumbar and lower thoracic degenerative disc disease most pronounced at L3-4 and L5-S1. 2. Small right-sided disc herniation at L1-2 without stenosis or nerve root compression. 3. Disc bulges at L2-3 and L3-4 without stenosis or nerve root compression. 4. Disc bulge with a right foraminal disc herniation at L4-5 with mild compression of the right L4 nerve root. 5. Grade 1 spondylolisthesis at L5-S1 secondary to bilateral spondylolysis of L5 with mild compression of both L5 nerve roots in the neural foramen. 6. Modic type 1 endplate changes adjacent to the L5-S1 disc. 7. Modic type 2 endplate changes adjacent to the L1-2, L3-4, and L5-S1 discs.  Impression: 1. Postoperative study following multilevel cervical laminectomy and fusion without spinal cord or nerve root compression. 2. Multilevel cervical degenerative disc disease with reversal of cervical lordosis. 3. Small area of myelomalacia in the spinal cord at the level of the C5 superior endplate.  11/17/21: follow up today. since last visit, he is getting shooting pain across his lower back. Taking Lyrica 100mg PO TID. with some improvement. getting n/t down the left arm. His right leg gives out, externally rotates when he walks. He can not "control" his right leg after it is crossed. -->weakness. given his history I think this warrants a new MRI to evaluate for further cord compression.   8/10/21- Patient had 80% relief from injection. Has been going to the gym.   7/12/21- Patient had improvement initial from TFESI. Will schedule repeat.  5/25/21: follow up today after b/l TFESI L5/S1 on 5/11/21. he reports 2 days following he was pain free. he reports pain in the lower back much improved. Getting Lyrica which is helping. still with n/t in the fingers and hands. will increase to 100mg bid.   5/4/21- Patent had spinal surgery in August 2020. Pain improved in neck. Does have numbness in arms. Pain is in back of thighs radiating down both legs. Low back pain is not that bad. Has been on Oxycodone. May need Lyrica. gets burning in the back of the legs.   1/11/21: F/U TODAY TO REVIEW MRI'S. (1/4/21) Impression: 1. Status post laminectomy and posterior fusion from C3-C7. There is a new large fluid collection, probable seroma, within the laminectomy surgical site measuring 7.1 x 3.2 x 1.6 cm with multiple thin internal septations. No significant surrounding edema to suggest infection. The collection does not directly contact the thecal sac. The spinal canal at the surgical levels is significantly decompressed since the previous exam.  2. There is mildly progressed disc space narrowing from C3-C7 as compared to the previous exam. 3. Multilevel disc bulges which have not significantly changed since the previous exam. No large new herniation. MRI lumbar spine: Impression: 1. Moderate to severe multilevel degenerative disc disease causing varying degrees of the spinal canal and neuroforaminal stenosis, detailed above. The bulge at L4-5 is mildly increased in size. 2. 1 cm grade 1 anterolisthesis of L5 on S1 with bilateral L5 spondylolysis pars defects, unchanged. GURMEET's were helping, however not getting as much prolonged relief. still with relief from last GURMEET.  12/29/20: follow up today after LESI L5/S1 on 12/15/20. Back pain has improved following >85%. He is having new clonus in the legs and arms. Given his history I believe a MRI of the Cervical and lumbar spine to further evaluate. having continues n/t in the arms that is getting worse.   10/27/20: Follow up today after LESI L5/S1 on 10/14/20. Back pain about 85% improved. Had tightness across the lower back that improved. Pain over the right lower back. MRI of the neck again reviewed. Will need MRI in the near future to evaluate the myelomalacia. Pain in the posterior left calf.   9/30/20- patient had neck surgery August 18 and is feeling better already. Now complaining of low back pain. Would like a repeat epidural so he can participate in PT. Will get clearance from Dr. Lawson. [] : no [FreeTextEntry6] : stiffness [FreeTextEntry7] : across the javier [de-identified] : getting up from a sitting position [de-identified] : mri l spine

## 2024-09-24 NOTE — ASU PATIENT PROFILE, ADULT - MEDICATIONS TO HOLD
no smoking cigarettes for 2 days prior to surgery, as per Dr Sumeet Katz No use of marijuana for 1 week prior to procedure as per Dr. Morton . May resume epidural injects 1 week post procedure

## 2024-09-24 NOTE — ASU PATIENT PROFILE, ADULT - NS PRO ABUSE SCREEN SUSPICION NEGLECT YN
Our goal in the emergency department is to always give you outstanding care and exceptional service. You may receive a survey by mail or e-mail in the next week regarding your experience in our ED. We would greatly appreciate your completing and returning the survey. Your feedback provides us with a way to recognize our staff who give very good care and it helps us learn how to improve when your experience was below our aspiration of excellence.     Return to the emergency department for worsening in any way including significant worsening of your vision, persistent or worsening headache, numbness or weakness in your arms or legs.  
no

## 2024-09-24 NOTE — ASU PATIENT PROFILE, ADULT - FALL HARM RISK - RISK INTERVENTIONS

## 2024-09-24 NOTE — ASU PATIENT PROFILE, ADULT - NSICDXPASTMEDICALHX_GEN_ALL_CORE_FT
PAST MEDICAL HISTORY:  Acute myocardial infarction 2022    Anxiety     H/O cardiomyopathy     HTN (hypertension)     Hyperlipidemia

## 2024-09-27 PROBLEM — I21.9 ACUTE MYOCARDIAL INFARCTION, UNSPECIFIED: Chronic | Status: ACTIVE | Noted: 2024-09-24

## 2024-09-27 PROBLEM — Z86.79 PERSONAL HISTORY OF OTHER DISEASES OF THE CIRCULATORY SYSTEM: Chronic | Status: ACTIVE | Noted: 2024-09-24

## 2024-09-30 ENCOUNTER — RESULT REVIEW (OUTPATIENT)
Age: 59
End: 2024-09-30

## 2024-09-30 ENCOUNTER — TRANSCRIPTION ENCOUNTER (OUTPATIENT)
Age: 59
End: 2024-09-30

## 2024-09-30 ENCOUNTER — OUTPATIENT (OUTPATIENT)
Dept: OUTPATIENT SERVICES | Facility: HOSPITAL | Age: 59
LOS: 1 days | End: 2024-09-30
Payer: MEDICARE

## 2024-09-30 VITALS
TEMPERATURE: 98 F | WEIGHT: 197.98 LBS | HEART RATE: 62 BPM | DIASTOLIC BLOOD PRESSURE: 87 MMHG | HEIGHT: 67 IN | OXYGEN SATURATION: 97 % | SYSTOLIC BLOOD PRESSURE: 136 MMHG | RESPIRATION RATE: 19 BRPM

## 2024-09-30 VITALS
HEART RATE: 74 BPM | OXYGEN SATURATION: 95 % | DIASTOLIC BLOOD PRESSURE: 87 MMHG | RESPIRATION RATE: 18 BRPM | SYSTOLIC BLOOD PRESSURE: 135 MMHG

## 2024-09-30 DIAGNOSIS — R80.9 PROTEINURIA, UNSPECIFIED: ICD-10-CM

## 2024-09-30 DIAGNOSIS — Z98.1 ARTHRODESIS STATUS: Chronic | ICD-10-CM

## 2024-09-30 DIAGNOSIS — Z98.890 OTHER SPECIFIED POSTPROCEDURAL STATES: Chronic | ICD-10-CM

## 2024-09-30 PROCEDURE — 88313 SPECIAL STAINS GROUP 2: CPT | Mod: 26

## 2024-09-30 PROCEDURE — 88305 TISSUE EXAM BY PATHOLOGIST: CPT | Mod: 26

## 2024-09-30 PROCEDURE — 88350 IMFLUOR EA ADDL 1ANTB STN PX: CPT | Mod: 26

## 2024-09-30 PROCEDURE — 77012 CT SCAN FOR NEEDLE BIOPSY: CPT | Mod: 26,RT

## 2024-09-30 PROCEDURE — 88348 ELECTRON MICROSCOPY DX: CPT | Mod: 26

## 2024-09-30 PROCEDURE — 50200 RENAL BIOPSY PERQ: CPT | Mod: RT

## 2024-09-30 PROCEDURE — 88346 IMFLUOR 1ST 1ANTB STAIN PX: CPT | Mod: 26

## 2024-09-30 RX ORDER — METOPROLOL TARTRATE 50 MG
1 TABLET ORAL
Refills: 0 | DISCHARGE

## 2024-09-30 RX ORDER — FUROSEMIDE 10 MG/ML
40 INJECTION INTRAVENOUS
Refills: 0 | DISCHARGE

## 2024-09-30 RX ORDER — TAMSULOSIN HCL 0.4 MG
1 CAPSULE ORAL
Refills: 0 | DISCHARGE

## 2024-09-30 NOTE — ASU DISCHARGE PLAN (ADULT/PEDIATRIC) - ASU DC SPECIAL INSTRUCTIONSFT
Biopsy Discharge    Discharge Instructions  - You have had a biopsy of your kidney  - You may shower in 24 hours. No soaking or swimming until the site is completely healed.  - Keep the area covered and dry for the next 24 hours.  - Do not perform any heavy lifting for the next few days or until the site is healed.  - You may resume your normal diet.  - You may resume your normal medications however you should wait 48 hours before restarting aspirin, plavix, or blood thinners.  - It is normal to experience some pain over the site for the next few days. You may take apply ice to the area (20 minutes on, 20 minutes off) and take Tylenol for that pain. Do not take more frequently than every 6 hours and do not exceed more than 3000mg of Tylenol in a 24 hour period.    - You were given conscious sedation which may make you drowsy, therefore you need someone to stay with you until the morning following the procedure.  - Do not drive, engage in heavy lifting or strenuous activity, or drink any alcoholic beverages for the next 24 hours.   - You may resume normal activity in 24 hours.    Notify your primary physician and/or Interventional Radiology IMMEDIATELY if you experience any of the following       - Fever of 100.4F or 38C       - Chills or Rigors/ Shakes       - Swelling and/or Redness in the area around the biopsy site       - Worsening Pain       - Blood soaked bandages or worsening bleeding       - Lightheadedness and/or dizziness upon standing       - Chest Pain/ Tightness       - Shortness of Breath       - Difficulty walking    If you have a problem that you believe requires IMMEDIATE attention, please go to your NEAREST Emergency Room. If you believe your problem can safely wait until you speak to a physician, please call Interventional Radiology for any concerns.    Please make a follow up appointment with Dr. Joyce at [phone] and call with any questions or concerns.  Please feel free to contact us at [facility] [phone] if any problems arise.

## 2024-09-30 NOTE — H&P ADULT - HISTORY OF PRESENT ILLNESS
Interventional Radiology    HPI: 59y Male with PMHx arteriosclerotic heart disease, anxiety, cervical disc disease, chronic renal insufficiency, HTN, ischemic cardiomyopathy, urethral stricture and urinary frequency, Seen by nephrology 8/27/24 for f/u of PRINCE on CKD and nephrotic range proteinuria and presents to IR today for kidney biopsy.    Review of Systems:   Constitutional: No fever, weight loss, or fatigue  Neurological: No headaches, memory loss, loss of strength, numbness, or tremors  Respiratory: No cough, wheezing, chills or hemoptysis; No shortness of breath  Cardiovascular: No chest pain, palpitations, dizziness, or leg swelling  Gastrointestinal: No abdominal or epigastric pain. No nausea, vomiting, or hematemesis; No diarrhea or constipation. No melena or hematochezia.  Skin: No itching, burning, rashes, or lesions   Musculoskeletal: No joint pain or swelling; No muscle, back, or extremity pain    Allergies: penicillin (Rash)    Medications (Abx/Cardiac/Anticoagulation/Blood Products)      Data:  170.2  89.8  T(C): 36.6  HR: 62  BP: 136/87  RR: 19  SpO2: 97%    Physical Exam  General: No acute distress, nontoxic, A&Ox3  Chest: Non labored breathing  Abdomen: Non-distended, non-tender  Extremities: No swelling  Skin: No rashes or lesions    RADIOLOGY & ADDITIONAL TESTS:    Imaging Reviewed    H & P Note Reviewed from: __6/19/24_____    Plan: 59y Male presents for kidney biopsy  -Risks/Benefits/alternatives explained with the patient and/or healthcare proxy and witnessed informed consent obtained.

## 2024-10-08 ENCOUNTER — APPOINTMENT (OUTPATIENT)
Dept: PAIN MANAGEMENT | Facility: CLINIC | Age: 59
End: 2024-10-08

## 2024-10-15 ENCOUNTER — APPOINTMENT (OUTPATIENT)
Dept: PAIN MANAGEMENT | Facility: CLINIC | Age: 59
End: 2024-10-15
Payer: MEDICARE

## 2024-10-15 DIAGNOSIS — Z98.1 ARTHRODESIS STATUS: ICD-10-CM

## 2024-10-15 DIAGNOSIS — M48.00 SPINAL STENOSIS, SITE UNSPECIFIED: ICD-10-CM

## 2024-10-15 PROCEDURE — 62323 NJX INTERLAMINAR LMBR/SAC: CPT

## 2024-10-29 ENCOUNTER — APPOINTMENT (OUTPATIENT)
Dept: PAIN MANAGEMENT | Facility: CLINIC | Age: 59
End: 2024-10-29

## 2024-11-11 ENCOUNTER — TRANSCRIPTION ENCOUNTER (OUTPATIENT)
Age: 59
End: 2024-11-11

## 2024-11-12 PROCEDURE — 50200 RENAL BIOPSY PERQ: CPT

## 2024-11-12 PROCEDURE — 88350 IMFLUOR EA ADDL 1ANTB STN PX: CPT

## 2024-11-12 PROCEDURE — 86900 BLOOD TYPING SEROLOGIC ABO: CPT

## 2024-11-12 PROCEDURE — 36415 COLL VENOUS BLD VENIPUNCTURE: CPT

## 2024-11-12 PROCEDURE — 86850 RBC ANTIBODY SCREEN: CPT

## 2024-11-12 PROCEDURE — 88346 IMFLUOR 1ST 1ANTB STAIN PX: CPT

## 2024-11-12 PROCEDURE — 88305 TISSUE EXAM BY PATHOLOGIST: CPT

## 2024-11-12 PROCEDURE — 77012 CT SCAN FOR NEEDLE BIOPSY: CPT

## 2024-11-12 PROCEDURE — 88348 ELECTRON MICROSCOPY DX: CPT

## 2024-11-12 PROCEDURE — 86901 BLOOD TYPING SEROLOGIC RH(D): CPT

## 2024-11-12 PROCEDURE — 88313 SPECIAL STAINS GROUP 2: CPT

## 2024-11-26 ENCOUNTER — APPOINTMENT (OUTPATIENT)
Dept: ORTHOPEDIC SURGERY | Facility: CLINIC | Age: 59
End: 2024-11-26
Payer: MEDICARE

## 2024-11-26 VITALS — HEIGHT: 67 IN | BODY MASS INDEX: 31.39 KG/M2 | WEIGHT: 200 LBS

## 2024-11-26 DIAGNOSIS — Z87.448 PERSONAL HISTORY OF OTHER DISEASES OF URINARY SYSTEM: ICD-10-CM

## 2024-11-26 DIAGNOSIS — Z98.1 ARTHRODESIS STATUS: ICD-10-CM

## 2024-11-26 DIAGNOSIS — M47.816 SPONDYLOSIS W/OUT MYELOPATHY OR RADICULOPATHY, LUMBAR REGION: ICD-10-CM

## 2024-11-26 DIAGNOSIS — Z98.1: ICD-10-CM

## 2024-11-26 DIAGNOSIS — M51.369: ICD-10-CM

## 2024-11-26 DIAGNOSIS — M48.00 SPINAL STENOSIS, SITE UNSPECIFIED: ICD-10-CM

## 2024-11-26 PROCEDURE — 72170 X-RAY EXAM OF PELVIS: CPT

## 2024-11-26 PROCEDURE — 72100 X-RAY EXAM L-S SPINE 2/3 VWS: CPT

## 2024-11-26 PROCEDURE — 99214 OFFICE O/P EST MOD 30 MIN: CPT

## 2025-01-28 ENCOUNTER — APPOINTMENT (OUTPATIENT)
Dept: ORTHOPEDIC SURGERY | Facility: CLINIC | Age: 60
End: 2025-01-28
Payer: MEDICARE

## 2025-01-28 VITALS — BODY MASS INDEX: 31.39 KG/M2 | HEIGHT: 67 IN | WEIGHT: 200 LBS

## 2025-01-28 DIAGNOSIS — M47.816 SPONDYLOSIS W/OUT MYELOPATHY OR RADICULOPATHY, LUMBAR REGION: ICD-10-CM

## 2025-01-28 DIAGNOSIS — Z87.891 PERSONAL HISTORY OF NICOTINE DEPENDENCE: ICD-10-CM

## 2025-01-28 DIAGNOSIS — Z98.1 ARTHRODESIS STATUS: ICD-10-CM

## 2025-01-28 DIAGNOSIS — Z87.448 PERSONAL HISTORY OF OTHER DISEASES OF URINARY SYSTEM: ICD-10-CM

## 2025-01-28 PROCEDURE — 99213 OFFICE O/P EST LOW 20 MIN: CPT

## 2025-02-28 ENCOUNTER — APPOINTMENT (OUTPATIENT)
Dept: PAIN MANAGEMENT | Facility: CLINIC | Age: 60
End: 2025-02-28

## 2025-03-05 ENCOUNTER — APPOINTMENT (OUTPATIENT)
Dept: PAIN MANAGEMENT | Facility: CLINIC | Age: 60
End: 2025-03-05
Payer: MEDICARE

## 2025-03-05 VITALS — WEIGHT: 193 LBS | BODY MASS INDEX: 30.29 KG/M2 | HEIGHT: 67 IN

## 2025-03-05 DIAGNOSIS — M48.00 SPINAL STENOSIS, SITE UNSPECIFIED: ICD-10-CM

## 2025-03-05 DIAGNOSIS — Z98.1 ARTHRODESIS STATUS: ICD-10-CM

## 2025-03-05 PROCEDURE — 99214 OFFICE O/P EST MOD 30 MIN: CPT

## 2025-03-17 ENCOUNTER — APPOINTMENT (OUTPATIENT)
Dept: PAIN MANAGEMENT | Facility: CLINIC | Age: 60
End: 2025-03-17

## 2025-04-10 ENCOUNTER — APPOINTMENT (OUTPATIENT)
Dept: PAIN MANAGEMENT | Facility: CLINIC | Age: 60
End: 2025-04-10

## 2025-04-10 DIAGNOSIS — M48.00 SPINAL STENOSIS, SITE UNSPECIFIED: ICD-10-CM

## 2025-04-10 PROCEDURE — 62323 NJX INTERLAMINAR LMBR/SAC: CPT

## 2025-04-15 ENCOUNTER — APPOINTMENT (OUTPATIENT)
Dept: PAIN MANAGEMENT | Facility: CLINIC | Age: 60
End: 2025-04-15

## 2025-04-21 ENCOUNTER — APPOINTMENT (OUTPATIENT)
Dept: ORTHOPEDIC SURGERY | Facility: CLINIC | Age: 60
End: 2025-04-21
Payer: MEDICARE

## 2025-04-21 VITALS — WEIGHT: 193 LBS | HEIGHT: 67 IN | BODY MASS INDEX: 30.29 KG/M2

## 2025-04-21 DIAGNOSIS — M51.369: ICD-10-CM

## 2025-04-21 DIAGNOSIS — Z98.1 ARTHRODESIS STATUS: ICD-10-CM

## 2025-04-21 DIAGNOSIS — Z98.1: ICD-10-CM

## 2025-04-21 DIAGNOSIS — M48.00 SPINAL STENOSIS, SITE UNSPECIFIED: ICD-10-CM

## 2025-04-21 PROCEDURE — 99213 OFFICE O/P EST LOW 20 MIN: CPT

## 2025-04-23 ENCOUNTER — APPOINTMENT (OUTPATIENT)
Dept: PAIN MANAGEMENT | Facility: CLINIC | Age: 60
End: 2025-04-23
Payer: MEDICARE

## 2025-04-23 VITALS — HEIGHT: 67 IN | BODY MASS INDEX: 30.29 KG/M2 | WEIGHT: 193 LBS

## 2025-04-23 DIAGNOSIS — M47.816 SPONDYLOSIS W/OUT MYELOPATHY OR RADICULOPATHY, LUMBAR REGION: ICD-10-CM

## 2025-04-23 DIAGNOSIS — Z87.448 PERSONAL HISTORY OF OTHER DISEASES OF URINARY SYSTEM: ICD-10-CM

## 2025-04-23 PROCEDURE — 99214 OFFICE O/P EST MOD 30 MIN: CPT

## 2025-04-23 PROCEDURE — 72100 X-RAY EXAM L-S SPINE 2/3 VWS: CPT

## 2025-05-01 NOTE — DISCHARGE NOTE PROVIDER - NSDCCONDITION_GEN_ALL_CORE
"Recommended To-Do List      Prepared on: May 1, 2025       You can get the best results from your medications by completing the items on this \"To-Do List.\"      Bring your To-Do List when you go to your doctor. And, share it with your family or caregivers.    My To-Do List:  What we talked about: What I should do:    What my medicines are for, how to know if my medicines are working, made sure my medicines are safe for me and reviewed how to take my medicines.    Take my medicines every day              " Stable

## 2025-05-11 NOTE — PATIENT PROFILE ADULT - NSPROPOAPRESSUREINJURY_GEN_A_NUR
Significant underlying cardiac history including stent (>1 year ago) and CABG (>5 years ago)  Currently not taking any medications  Pain worse at rest  Minimal troponin elevation, 12 to 14  EKG unchanged per ED  - Cardio consult  - Nitro prn  - Metoprolol, lipitor, lisinopril started  - TTE
no

## 2025-07-15 ENCOUNTER — APPOINTMENT (OUTPATIENT)
Dept: ORTHOPEDIC SURGERY | Facility: CLINIC | Age: 60
End: 2025-07-15

## 2025-07-15 ENCOUNTER — RESULT CHARGE (OUTPATIENT)
Age: 60
End: 2025-07-15

## 2025-07-15 VITALS — BODY MASS INDEX: 30.29 KG/M2 | HEIGHT: 67 IN | WEIGHT: 193 LBS

## 2025-07-15 PROBLEM — M75.42 IMPINGEMENT SYNDROME OF LEFT SHOULDER: Status: ACTIVE | Noted: 2025-07-15

## 2025-07-15 PROBLEM — M75.22 TENDONITIS OF UPPER BICEPS TENDON OF LEFT SHOULDER: Status: ACTIVE | Noted: 2025-07-15

## 2025-07-15 PROCEDURE — 20610 DRAIN/INJ JOINT/BURSA W/O US: CPT | Mod: LT

## 2025-07-15 PROCEDURE — 73010 X-RAY EXAM OF SHOULDER BLADE: CPT | Mod: LT

## 2025-07-15 PROCEDURE — 73030 X-RAY EXAM OF SHOULDER: CPT | Mod: LT

## 2025-07-15 PROCEDURE — 99204 OFFICE O/P NEW MOD 45 MIN: CPT | Mod: 25

## 2025-07-15 PROCEDURE — 72040 X-RAY EXAM NECK SPINE 2-3 VW: CPT

## 2025-07-15 RX ORDER — ANASTROZOLE TABLETS 1 MG/1
TABLET ORAL
Refills: 0 | Status: ACTIVE | COMMUNITY

## 2025-07-15 RX ORDER — TAMSULOSIN HYDROCHLORIDE 0.4 MG/1
0.4 CAPSULE ORAL
Refills: 0 | Status: ACTIVE | COMMUNITY

## 2025-07-15 RX ORDER — FUROSEMIDE 20 MG/1
20 TABLET ORAL
Refills: 0 | Status: ACTIVE | COMMUNITY

## 2025-07-15 RX ORDER — NIFEDIPINE 30 MG
30 TABLET, EXTENDED RELEASE ORAL
Refills: 0 | Status: ACTIVE | COMMUNITY

## 2025-07-15 RX ORDER — METOPROLOL SUCCINATE 50 MG/1
50 TABLET, EXTENDED RELEASE ORAL
Refills: 0 | Status: ACTIVE | COMMUNITY

## 2025-07-15 RX ORDER — ATORVASTATIN CALCIUM 20 MG/1
20 TABLET, FILM COATED ORAL
Refills: 0 | Status: ACTIVE | COMMUNITY

## 2025-07-15 RX ORDER — TESTOSTERONE ENANTHATE 75 MG/.5ML
75 INJECTION SUBCUTANEOUS
Refills: 0 | Status: ACTIVE | COMMUNITY

## 2025-07-15 RX ORDER — HYDRALAZINE HYDROCHLORIDE 25 MG/1
25 TABLET ORAL
Refills: 0 | Status: ACTIVE | COMMUNITY

## 2025-07-17 ENCOUNTER — APPOINTMENT (OUTPATIENT)
Dept: MRI IMAGING | Facility: CLINIC | Age: 60
End: 2025-07-17

## 2025-07-24 ENCOUNTER — APPOINTMENT (OUTPATIENT)
Dept: ORTHOPEDIC SURGERY | Facility: CLINIC | Age: 60
End: 2025-07-24

## 2025-09-02 ENCOUNTER — APPOINTMENT (OUTPATIENT)
Dept: RADIOLOGY | Facility: CLINIC | Age: 60
End: 2025-09-02
Payer: COMMERCIAL

## 2025-09-02 PROCEDURE — 73030 X-RAY EXAM OF SHOULDER: CPT | Mod: LT

## 2025-09-04 ENCOUNTER — APPOINTMENT (OUTPATIENT)
Dept: PAIN MANAGEMENT | Facility: CLINIC | Age: 60
End: 2025-09-04
Payer: MEDICARE

## 2025-09-04 DIAGNOSIS — M48.00 SPINAL STENOSIS, SITE UNSPECIFIED: ICD-10-CM

## 2025-09-04 PROCEDURE — 62323 NJX INTERLAMINAR LMBR/SAC: CPT

## 2025-09-16 ENCOUNTER — APPOINTMENT (OUTPATIENT)
Dept: ORTHOPEDIC SURGERY | Facility: CLINIC | Age: 60
End: 2025-09-16
Payer: MEDICARE

## 2025-09-16 VITALS — WEIGHT: 193 LBS | HEIGHT: 67 IN | BODY MASS INDEX: 30.29 KG/M2

## 2025-09-16 DIAGNOSIS — M48.00 SPINAL STENOSIS, SITE UNSPECIFIED: ICD-10-CM

## 2025-09-16 DIAGNOSIS — Z98.1 ARTHRODESIS STATUS: ICD-10-CM

## 2025-09-16 DIAGNOSIS — M47.816 SPONDYLOSIS W/OUT MYELOPATHY OR RADICULOPATHY, LUMBAR REGION: ICD-10-CM

## 2025-09-16 PROCEDURE — 99214 OFFICE O/P EST MOD 30 MIN: CPT

## (undated) DEVICE — DRSG STERISTRIPS 0.5 X 4"

## (undated) DEVICE — DRILL BIT NUVASIVE PULSE AO 3MM

## (undated) DEVICE — DRAPE SPLIT SHEET 77" X 120"

## (undated) DEVICE — PACK BASIC

## (undated) DEVICE — DRAPE IOBAN 23" X 17"

## (undated) DEVICE — TUBING SUCTION NONCONDUCTIVE 6MM X 12FT

## (undated) DEVICE — NUVASIVE NVJJB / M5 I-PAS III (BEVELED TIP)

## (undated) DEVICE — SUT MONOCRYL 4-0 27" PS-2 UNDYED

## (undated) DEVICE — WARMING BLANKET LOWER ADULT

## (undated) DEVICE — ELCTR BOVIE TIP BLADE INSULATED 4" EDGE

## (undated) DEVICE — KIT ARRAY PULSE PATIENT REFERENCE

## (undated) DEVICE — VENODYNE/SCD SLEEVE CALF MEDIUM

## (undated) DEVICE — SUT VICRYL 0 18" CT-1 UNDYED (POP-OFF)

## (undated) DEVICE — SUT SILK 2-0 30" TIES

## (undated) DEVICE — POSITIONER FOAM LAMINECTOMY ARM CRADLE (PINK)

## (undated) DEVICE — PACK MAJOR ABDOMINAL WITH LAP

## (undated) DEVICE — DRAPE SHOWER CURTAIN ISOLATION

## (undated) DEVICE — PACK POSTERIOR SPINAL FUSION

## (undated) DEVICE — DRAPE TOWEL BLUE 17" X 24"

## (undated) DEVICE — DRSG XEROFORM 5 X 9"

## (undated) DEVICE — GLV 9 PROTEXIS ORTHO (BROWN)

## (undated) DEVICE — DRAPE SURGICAL #1010

## (undated) DEVICE — FRA-ESU BOVIE FORCE TRIAD T6D04548DX: Type: DURABLE MEDICAL EQUIPMENT

## (undated) DEVICE — ELCTR BOVIE TIP BLADE INSULATED 6.5" EDGE

## (undated) DEVICE — ELCTR BOVIE TIP BLADE INSULATED 2.75" EDGE WITH SAFETY

## (undated) DEVICE — DRAPE C ARM 41X140"

## (undated) DEVICE — FOLEY TRAY 14FR 5CC LF UMETER CLOSED

## (undated) DEVICE — DRSG DERMABOND PRINEO 22CM

## (undated) DEVICE — DRAPE MAYO STAND 30"

## (undated) DEVICE — SUT PDS II 2-0 27" CT-1

## (undated) DEVICE — GLV 7.5 PROTEXIS (WHITE)

## (undated) DEVICE — SUT PDS II 0 36" CT-1

## (undated) DEVICE — WARMING BLANKET UPPER ADULT

## (undated) DEVICE — SUT SILK 0 30" TIES

## (undated) DEVICE — DRAPE INSTRUMENT POUCH 7" X 12"

## (undated) DEVICE — SOL IRR POUR NS 0.9% 1000ML

## (undated) DEVICE — GOWN XXL EXTRA LONG

## (undated) DEVICE — SUT VICRYL 1 18" CT-1 UNDYED (POP-OFF)

## (undated) DEVICE — DRSG TEGADERM 4X4.75"

## (undated) DEVICE — SUT CHROMIC 3-0 30" V-20

## (undated) DEVICE — DRSG EYE PAD OVAL STERILE

## (undated) DEVICE — SUT VICRYL 2-0 18" CP-2 UNDYED (POP-OFF)

## (undated) DEVICE — GLV 8 PROTEXIS (BLUE)

## (undated) DEVICE — STAPLER SKIN VISI-STAT 35 WIDE

## (undated) DEVICE — DRAPE LIGHT HANDLE COVER (GREEN)

## (undated) DEVICE — DRAPE 3/4 SHEET W REINFORCEMENT 56X77"

## (undated) DEVICE — MISONIX BONESCALPEL BLUNT BLADE & TUBESET 20MM

## (undated) DEVICE — ELCTR BOVIE TIP CLEANER SCRATCH PAD 2 X 2"

## (undated) DEVICE — SUT STRATAFIX SPIRAL MONOCRYL PLUS 4-0 45CM PS-2 UNDYED

## (undated) DEVICE — TAP NAVS RELINE CONSOL 3.75-4.5MM